# Patient Record
Sex: FEMALE | Race: WHITE | NOT HISPANIC OR LATINO | Employment: UNEMPLOYED | URBAN - METROPOLITAN AREA
[De-identification: names, ages, dates, MRNs, and addresses within clinical notes are randomized per-mention and may not be internally consistent; named-entity substitution may affect disease eponyms.]

---

## 2017-03-22 ENCOUNTER — OFFICE VISIT - RIVER FALLS (OUTPATIENT)
Dept: FAMILY MEDICINE | Facility: CLINIC | Age: 57
End: 2017-03-22

## 2017-03-25 ENCOUNTER — OFFICE VISIT - RIVER FALLS (OUTPATIENT)
Dept: FAMILY MEDICINE | Facility: CLINIC | Age: 57
End: 2017-03-25

## 2017-03-25 ASSESSMENT — MIFFLIN-ST. JEOR: SCORE: 1106.01

## 2017-05-24 ENCOUNTER — OFFICE VISIT - RIVER FALLS (OUTPATIENT)
Dept: FAMILY MEDICINE | Facility: CLINIC | Age: 57
End: 2017-05-24

## 2017-05-24 ENCOUNTER — COMMUNICATION - RIVER FALLS (OUTPATIENT)
Dept: FAMILY MEDICINE | Facility: CLINIC | Age: 57
End: 2017-05-24

## 2017-05-24 ASSESSMENT — MIFFLIN-ST. JEOR: SCORE: 1112.36

## 2017-05-25 ENCOUNTER — OFFICE VISIT - RIVER FALLS (OUTPATIENT)
Dept: FAMILY MEDICINE | Facility: CLINIC | Age: 57
End: 2017-05-25

## 2017-05-25 LAB
CHOLEST SERPL-MCNC: 213 MG/DL (ref 125–200)
CHOLEST/HDLC SERPL: 2.5 {RATIO}
CREAT SERPL-MCNC: 0.72 MG/DL (ref 0.5–1.05)
GLUCOSE BLD-MCNC: 81 MG/DL (ref 65–99)
HDLC SERPL-MCNC: 84 MG/DL
LDLC SERPL CALC-MCNC: 119 MG/DL
NONHDLC SERPL-MCNC: 129 MG/DL
TRIGL SERPL-MCNC: 49 MG/DL

## 2017-06-01 ENCOUNTER — OFFICE VISIT - RIVER FALLS (OUTPATIENT)
Dept: FAMILY MEDICINE | Facility: CLINIC | Age: 57
End: 2017-06-01

## 2017-09-20 ENCOUNTER — OFFICE VISIT - RIVER FALLS (OUTPATIENT)
Dept: FAMILY MEDICINE | Facility: CLINIC | Age: 57
End: 2017-09-20

## 2017-09-20 ASSESSMENT — MIFFLIN-ST. JEOR: SCORE: 1121.43

## 2017-11-08 ENCOUNTER — OFFICE VISIT - RIVER FALLS (OUTPATIENT)
Dept: FAMILY MEDICINE | Facility: CLINIC | Age: 57
End: 2017-11-08

## 2017-11-08 ASSESSMENT — MIFFLIN-ST. JEOR: SCORE: 1124.15

## 2018-01-15 ENCOUNTER — OFFICE VISIT - RIVER FALLS (OUTPATIENT)
Dept: FAMILY MEDICINE | Facility: CLINIC | Age: 58
End: 2018-01-15

## 2018-04-11 ENCOUNTER — OFFICE VISIT - RIVER FALLS (OUTPATIENT)
Dept: FAMILY MEDICINE | Facility: CLINIC | Age: 58
End: 2018-04-11

## 2018-06-29 ENCOUNTER — OFFICE VISIT - RIVER FALLS (OUTPATIENT)
Dept: FAMILY MEDICINE | Facility: CLINIC | Age: 58
End: 2018-06-29

## 2018-06-29 ASSESSMENT — MIFFLIN-ST. JEOR: SCORE: 1129.94

## 2018-10-18 ENCOUNTER — AMBULATORY - RIVER FALLS (OUTPATIENT)
Dept: FAMILY MEDICINE | Facility: CLINIC | Age: 58
End: 2018-10-18

## 2019-03-04 ENCOUNTER — OFFICE VISIT - RIVER FALLS (OUTPATIENT)
Dept: FAMILY MEDICINE | Facility: CLINIC | Age: 59
End: 2019-03-04

## 2019-03-04 ASSESSMENT — MIFFLIN-ST. JEOR: SCORE: 1139.01

## 2019-08-22 ENCOUNTER — OFFICE VISIT - RIVER FALLS (OUTPATIENT)
Dept: FAMILY MEDICINE | Facility: CLINIC | Age: 59
End: 2019-08-22

## 2019-08-22 ASSESSMENT — MIFFLIN-ST. JEOR: SCORE: 1124.72

## 2019-08-23 ENCOUNTER — AMBULATORY - RIVER FALLS (OUTPATIENT)
Dept: FAMILY MEDICINE | Facility: CLINIC | Age: 59
End: 2019-08-23

## 2019-08-24 LAB
BUN SERPL-MCNC: 19 MG/DL (ref 7–25)
BUN/CREAT RATIO - HISTORICAL: NORMAL (ref 6–22)
CALCIUM SERPL-MCNC: 10 MG/DL (ref 8.6–10.4)
CHLORIDE BLD-SCNC: 104 MMOL/L (ref 98–110)
CHOLEST SERPL-MCNC: 206 MG/DL
CHOLEST/HDLC SERPL: 2.5 {RATIO}
CO2 SERPL-SCNC: 29 MMOL/L (ref 20–32)
CREAT SERPL-MCNC: 0.79 MG/DL (ref 0.5–1.05)
EGFRCR SERPLBLD CKD-EPI 2021: 83 ML/MIN/1.73M2
GLUCOSE BLD-MCNC: 89 MG/DL (ref 65–99)
HBA1C MFR BLD: 5.4 %
HDLC SERPL-MCNC: 83 MG/DL
LDLC SERPL CALC-MCNC: 110 MG/DL
NONHDLC SERPL-MCNC: 123 MG/DL
POTASSIUM BLD-SCNC: 4.7 MMOL/L (ref 3.5–5.3)
SODIUM SERPL-SCNC: 140 MMOL/L (ref 135–146)
TRIGL SERPL-MCNC: 50 MG/DL

## 2019-08-27 ENCOUNTER — COMMUNICATION - RIVER FALLS (OUTPATIENT)
Dept: FAMILY MEDICINE | Facility: CLINIC | Age: 59
End: 2019-08-27

## 2019-11-04 ENCOUNTER — AMBULATORY - RIVER FALLS (OUTPATIENT)
Dept: FAMILY MEDICINE | Facility: CLINIC | Age: 59
End: 2019-11-04

## 2020-10-12 ENCOUNTER — OFFICE VISIT - RIVER FALLS (OUTPATIENT)
Dept: FAMILY MEDICINE | Facility: CLINIC | Age: 60
End: 2020-10-12

## 2020-10-12 ASSESSMENT — MIFFLIN-ST. JEOR: SCORE: 1125.4

## 2020-10-13 ENCOUNTER — COMMUNICATION - RIVER FALLS (OUTPATIENT)
Dept: FAMILY MEDICINE | Facility: CLINIC | Age: 60
End: 2020-10-13

## 2020-10-13 LAB
CHOLEST SERPL-MCNC: 196 MG/DL
CHOLEST/HDLC SERPL: 2.3 {RATIO}
GLUCOSE BLD-MCNC: 78 MG/DL (ref 65–99)
HDLC SERPL-MCNC: 84 MG/DL
LDLC SERPL CALC-MCNC: 98 MG/DL
NONHDLC SERPL-MCNC: 112 MG/DL
TRIGL SERPL-MCNC: 46 MG/DL

## 2020-10-20 ENCOUNTER — OFFICE VISIT - RIVER FALLS (OUTPATIENT)
Dept: FAMILY MEDICINE | Facility: CLINIC | Age: 60
End: 2020-10-20

## 2021-01-06 ENCOUNTER — OFFICE VISIT - RIVER FALLS (OUTPATIENT)
Dept: FAMILY MEDICINE | Facility: CLINIC | Age: 61
End: 2021-01-06

## 2021-01-06 ASSESSMENT — MIFFLIN-ST. JEOR: SCORE: 1121.4

## 2021-07-14 ENCOUNTER — OFFICE VISIT - RIVER FALLS (OUTPATIENT)
Dept: FAMILY MEDICINE | Facility: CLINIC | Age: 61
End: 2021-07-14

## 2021-07-15 LAB
ALBUMIN UR-MCNC: NEGATIVE G/DL
APPEARANCE UR: CLEAR
BILIRUB UR QL STRIP: NEGATIVE
COLOR UR AUTO: YELLOW
GLUCOSE UR STRIP-MCNC: NEGATIVE MG/DL
HGB UR QL STRIP: NEGATIVE
KETONES UR STRIP-MCNC: NEGATIVE MG/DL
LEUKOCYTE ESTERASE UR QL STRIP: NEGATIVE
NITRATE UR QL: NEGATIVE
PH UR STRIP: 5.5 [PH]
SP GR UR STRIP: 1.02
UROBILINOGEN UR STRIP-MCNC: NORMAL MG/DL

## 2021-07-16 ENCOUNTER — COMMUNICATION - RIVER FALLS (OUTPATIENT)
Dept: FAMILY MEDICINE | Facility: CLINIC | Age: 61
End: 2021-07-16

## 2021-07-16 LAB — BACTERIA SPEC CULT: NORMAL

## 2021-10-25 ENCOUNTER — AMBULATORY - RIVER FALLS (OUTPATIENT)
Dept: FAMILY MEDICINE | Facility: CLINIC | Age: 61
End: 2021-10-25

## 2021-10-26 ENCOUNTER — COMMUNICATION - RIVER FALLS (OUTPATIENT)
Dept: FAMILY MEDICINE | Facility: CLINIC | Age: 61
End: 2021-10-26

## 2021-10-26 LAB
CHOLEST SERPL-MCNC: 219 MG/DL
CHOLEST/HDLC SERPL: 2.8 {RATIO}
GLUCOSE BLD-MCNC: 85 MG/DL (ref 65–99)
HDLC SERPL-MCNC: 78 MG/DL
LDLC SERPL CALC-MCNC: 126 MG/DL
NONHDLC SERPL-MCNC: 141 MG/DL
TRIGL SERPL-MCNC: 61 MG/DL

## 2021-11-01 ENCOUNTER — OFFICE VISIT - RIVER FALLS (OUTPATIENT)
Dept: FAMILY MEDICINE | Facility: CLINIC | Age: 61
End: 2021-11-01

## 2021-11-01 ASSESSMENT — MIFFLIN-ST. JEOR: SCORE: 1141.28

## 2021-11-04 LAB — HPV HIGH RISK: NOT DETECTED

## 2021-11-05 ENCOUNTER — COMMUNICATION - RIVER FALLS (OUTPATIENT)
Dept: FAMILY MEDICINE | Facility: CLINIC | Age: 61
End: 2021-11-05

## 2022-02-12 VITALS
DIASTOLIC BLOOD PRESSURE: 62 MMHG | WEIGHT: 127 LBS | HEART RATE: 50 BPM | BODY MASS INDEX: 21.63 KG/M2 | SYSTOLIC BLOOD PRESSURE: 102 MMHG

## 2022-02-12 VITALS
TEMPERATURE: 97.6 F | DIASTOLIC BLOOD PRESSURE: 76 MMHG | HEART RATE: 48 BPM | SYSTOLIC BLOOD PRESSURE: 153 MMHG | BODY MASS INDEX: 22.18 KG/M2 | WEIGHT: 130.2 LBS

## 2022-02-12 VITALS
WEIGHT: 127 LBS | BODY MASS INDEX: 21.16 KG/M2 | HEIGHT: 65 IN | TEMPERATURE: 96.5 F | RESPIRATION RATE: 16 BRPM | HEART RATE: 44 BPM | SYSTOLIC BLOOD PRESSURE: 108 MMHG | DIASTOLIC BLOOD PRESSURE: 64 MMHG

## 2022-02-12 VITALS
SYSTOLIC BLOOD PRESSURE: 138 MMHG | DIASTOLIC BLOOD PRESSURE: 72 MMHG | OXYGEN SATURATION: 98 % | HEART RATE: 38 BPM | WEIGHT: 124 LBS | TEMPERATURE: 97.3 F | HEIGHT: 65 IN | BODY MASS INDEX: 20.66 KG/M2

## 2022-02-12 VITALS
DIASTOLIC BLOOD PRESSURE: 68 MMHG | DIASTOLIC BLOOD PRESSURE: 72 MMHG | BODY MASS INDEX: 20.59 KG/M2 | WEIGHT: 120.6 LBS | SYSTOLIC BLOOD PRESSURE: 106 MMHG | TEMPERATURE: 98.7 F | WEIGHT: 121 LBS | BODY MASS INDEX: 20.61 KG/M2 | HEIGHT: 64 IN | OXYGEN SATURATION: 99 % | WEIGHT: 122 LBS | BODY MASS INDEX: 20.83 KG/M2 | SYSTOLIC BLOOD PRESSURE: 118 MMHG | HEART RATE: 51 BPM | TEMPERATURE: 97.4 F | HEART RATE: 58 BPM | HEART RATE: 52 BPM | DIASTOLIC BLOOD PRESSURE: 66 MMHG | SYSTOLIC BLOOD PRESSURE: 118 MMHG | HEIGHT: 64 IN

## 2022-02-12 VITALS
HEIGHT: 64 IN | SYSTOLIC BLOOD PRESSURE: 110 MMHG | HEIGHT: 64 IN | BODY MASS INDEX: 21.27 KG/M2 | DIASTOLIC BLOOD PRESSURE: 60 MMHG | SYSTOLIC BLOOD PRESSURE: 122 MMHG | DIASTOLIC BLOOD PRESSURE: 68 MMHG | TEMPERATURE: 98.3 F | HEART RATE: 48 BPM | HEART RATE: 48 BPM | WEIGHT: 124.6 LBS | BODY MASS INDEX: 21.17 KG/M2 | WEIGHT: 124 LBS

## 2022-02-12 VITALS
DIASTOLIC BLOOD PRESSURE: 66 MMHG | BODY MASS INDEX: 20.83 KG/M2 | TEMPERATURE: 97.3 F | SYSTOLIC BLOOD PRESSURE: 126 MMHG | HEIGHT: 65 IN | WEIGHT: 125 LBS | HEART RATE: 52 BPM

## 2022-02-12 VITALS
SYSTOLIC BLOOD PRESSURE: 124 MMHG | TEMPERATURE: 97.9 F | WEIGHT: 123.12 LBS | DIASTOLIC BLOOD PRESSURE: 72 MMHG | BODY MASS INDEX: 20.51 KG/M2 | HEIGHT: 65 IN | HEART RATE: 44 BPM

## 2022-02-12 VITALS
HEART RATE: 52 BPM | HEIGHT: 64 IN | SYSTOLIC BLOOD PRESSURE: 112 MMHG | WEIGHT: 125.6 LBS | TEMPERATURE: 98.2 F | OXYGEN SATURATION: 98 % | BODY MASS INDEX: 21.44 KG/M2 | DIASTOLIC BLOOD PRESSURE: 66 MMHG

## 2022-02-12 VITALS
TEMPERATURE: 97.2 F | HEART RATE: 39 BPM | BODY MASS INDEX: 21.63 KG/M2 | OXYGEN SATURATION: 99 % | DIASTOLIC BLOOD PRESSURE: 65 MMHG | WEIGHT: 127 LBS | SYSTOLIC BLOOD PRESSURE: 144 MMHG

## 2022-02-12 VITALS
HEIGHT: 65 IN | DIASTOLIC BLOOD PRESSURE: 66 MMHG | HEART RATE: 44 BPM | BODY MASS INDEX: 21.24 KG/M2 | SYSTOLIC BLOOD PRESSURE: 112 MMHG | WEIGHT: 127.5 LBS

## 2022-02-16 NOTE — PROGRESS NOTES
Patient:   TONA LEONE            MRN: [id number]            FIN: [id number]               Age:   57 years     Sex:  Female     :  1960   Associated Diagnoses:   Keratosis, seborrheic   Author:   Aníbal Bauer MD      Chief Complaint   1/15/2018 10:44 AM CST   Skin concern, right face/cheek.        History of Present Illness   chief complaint and symptoms as noted above confirmed with patient   lesion for a few years  a bit bigger      Review of Systems   Integumentary:  Negative except as documented in history of present illness.    Neurologic:  Negative.       Health Status   Allergies:    Allergic Reactions (Selected)  Severity Not Documented  Amoxicillin (Rash)  Penicillin (No reactions were documented)  Sulfa drug (No reactions were documented)   Medications:  (Selected)   Documented Medications  Documented  Daily Multiple Vitamins: 1 tab(s), po, daily, 0 Refill(s), Type: Maintenance  Fish Oil: po, daily, 0 Refill(s), Type: Maintenance  Iron Chews: ( 15 mg ), po, daily, 0 Refill(s), Type: Maintenance  Vitamin D3: po, daily, 0 Refill(s), Type: Maintenance   Problem list:    All Problems (Selected)  BRCA1 positive / SNOMED CT 7687077382 / Confirmed  History of breast cancer / SNOMED CT 8724684272 / Confirmed  Seasonal allergies / SNOMED CT 462779184 / Confirmed      Histories   Past Medical History:    Active  History of breast cancer (1083251635): Onset on 2013 at 53 years.  Comments:  2013 CDT 3:27 PM CDT - aCmden NP-C, Olivia  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  BRCA1 positive (6600575188)  Seasonal allergies (704019488)  Resolved  Atypical squamous cells of undetermined significance (ASCUS) on Papanicolaou smear of cervix (7501088335): Onset in the month of 2000 at 40 years  Resolved on 5/3/2010 at 50 years.  Cyst, ovary, dermoid (7252403177):  Resolved on 5/3/2010 at 50 years.  Comments:  5/3/2010 CDT 9:14 AM CDT - Nicci Anderson  left oophorectomy  Pregnancy  (331150442):  Resolved in  at 24 years.  Pregnancy (809982246):  Resolved in  at 26 years.  Pregnancy (798870677):  Resolved in  at 29 years.  History of chicken pox (757916433):  Resolved.   Family History:    CA - Breast cancer  Grandmother (M) ()  Comments:  3/6/2013 2:43 PM - Adelaida Antonio  post menopausal  Diabetes mellitus  Brother  Breast cancer  Aunt (M)  Comments:  2013 4:09 PM - Cassandra-Woll NP-C, Olivia  Post-menopausal.  Aunt (P)  Comments:  2013 4:09 PM - Cassandra-Woll NP-C, Olivia  Post-menopausal.  Prediabetes  Mother ()  Pancreatic cancer  Mother ()  Diabetes mellitus type 2  Father  Grandmother (M) ()  High blood pressure  Brother  Brother  Myocardial infarction  Father  Stroke  Grandmother (P) ()  Cancer  Grandfather (P) ()  CABG - Coronary artery bypass graft  Father: onset at 75 .  Comments:  3/6/2013 2:46 PM - Adelaida Antonio  stents also  MI - Myocardial infarction  Mother (): onset at 78 .  Comments:  3/6/2013 2:47 PM - Adelaida Antonio  mild  Coronary artery disease  Mother ()  CAD - Coronary artery disease  Father     Procedure history:    Right salpingo-oophorectomy (SNOMED CT 158572201) performed by Gus Snell MD on 2015 at 54 Years.  Breast reconstruction (SNOMED CT 90569719) in the month of 2014 at 54 Years.  Simple mastectomy of right breast (SNOMED CT 5651076057) performed by Gus Andrade on 2013 at 53 Years.  Insertion of tunneled centrally inserted central venous access device, with subcutaneous port; age 5 years or older (CPT-4 21223) performed by Gus Andrade on 2013 at 53 Years.  Lumpectomy of breast (SNOMED CT 1638206339) on 2013 at 53 Years.  Biopsy of breast (SNOMED CT 660849933) on 2013 at 53 Years.  Comments:  2013 3:24 PM - Cassandra-Woll NP-C, Olivia  Positive.  Colonoscopy (SNOMED CT 479244267) performed by Lalito Pineda MD on 2012 at 52  Years.  Comments:  7/12/2012 10:22 AM - Cassius MCKENZIE Maye  Normal repeat in 10 years  DEXA - Dual energy X-ray photon absorptiometry (SNOMED CT 941466712) on 4/23/2012 at 52 Years.  Comments:  4/26/2012 9:03 AM - Olivia Cruz. Just starting perimenopause.  HPV - Human papillomavirus test negative (SNOMED CT 7900286880) on 3/1/2009 at 49 Years.  Comments:  3/29/2011 3:21 PM - Olivia Cruz  Low risk for cervical cancer. OK to have pap q 3 yrs.  Laparotomy (SNOMED CT 757534568) on 5/17/1999 at 39 Years.  Left oophorectomy (SNOMED CT 958473115) on 5/17/1999 at 39 Years.  Comments:  3/29/2011 3:24 PM - Olivia Cruz  Dermoid cyst.  Colposcopy (SNOMED CT 8453317396).   Social History:        Alcohol Assessment            Current, Daily, 1 drinks/episode average.  2 drinks/episode maximum.      Tobacco Assessment            Never smoker      Substance Abuse Assessment            Never      Employment and Education Assessment            Employed, Work/School description: .      Home and Environment Assessment            Marital status: .  Spouse/Partner name: Dilshad.  Risks in environment: owns secured gun.      Nutrition and Health Assessment            Type of diet: Regular.      Exercise and Physical Activity Assessment            Exercise frequency: 6 times/week.  Exercise type: Running, Weight lifting.      Sexual Assessment            Sexually active: Yes.  Sexual orientation: Heterosexual.  Contraceptive Use Details:  had vasectomy.        Physical Examination   Vital Signs   1/15/2018 10:44 AM CST Temperature Tympanic 97.6 DegF  LOW    Peripheral Pulse Rate 48 bpm  LOW    HR Method Electronic    Systolic Blood Pressure 153 mmHg  HI    Diastolic Blood Pressure 76 mmHg    Mean Arterial Pressure 102 mmHg    BP Method Electronic      Measurements from flowsheet : Measurements   1/15/2018 10:44 AM CST   Weight Measured - Standard                130.2 lb      General:  Alert and oriented, No acute distress.    Integumentary:  1cm tan waxy elevated left lateral face lesion    frozen x3 with liquid nitrogen.    Neurologic:  Alert, Oriented.       Impression and Plan   Diagnosis     Keratosis, seborrheic (JGI26-MG L82.1).     Course:  Progressing as expected.    Plan:  Wound therapy, rech in 2-3 weeks.    Patient Instructions:       Counseled: Patient, Regarding diagnosis, Activity.

## 2022-02-16 NOTE — NURSING NOTE
Depression Screening Entered On:  11/1/2021 12:03 PM CDT    Performed On:  11/1/2021 12:02 PM CDT by Matthew Jones LPN               Depression Screening   Little Interest - Pleasure in Activities :   Not at all   Feeling Down, Depressed, Hopeless :   Not at all   Initial Depression Screen Score :   0 Score   Poor Appetite or Overeating :   Not at all   Trouble Falling or Staying Asleep :   Not at all   Feeling Tired or Little Energy :   Not at all   Feeling Bad About Yourself :   Not at all   Trouble Concentrating :   Not at all   Moving or Speaking Slowly :   Not at all   Thoughts Better Off Dead or Hurting Self :   Not at all   Detailed Depression Screen Score :   0    Total Depression Screen Score :   0    Matthew Jones LPN - 11/1/2021 12:02 PM CDT

## 2022-02-16 NOTE — PROGRESS NOTES
Patient:   TONA LEONE            MRN: [id number]            FIN: [id number]               Age:   57 years     Sex:  Female     :  1960   Associated Diagnoses:   Breast Cancer; FH: heart disease; FH: pancreatic cancer; Well woman exam   Author:   Jennifer Weston      Visit Information      Date of Service: 2017 07:53 am  Performing Location: [facility]  Encounter#: [id number]      Primary Care Provider (PCP):  Aníbal Bauer MD# [id number]      Chief Complaint   2017 7:53 AM CDT    Annual exam.      Well Adult History   PPC for her annual wellness exam  Pap due this yr (NILM )  right salpingo-oophrectomy; left oophrectomy  hx of breast cancer with simple mastectomy: bilateral reconstruction  colonscopy due   dexa done prior to chemo so this should be repeataed  see dentist at least annually and see eye doctor annually  , supportive relationship, no concerns with libido  three adult sons  PHQ9=0         Review of Systems   Constitutional:  Negative.    Eye:  Negative.    Ear/Nose/Mouth/Throat:  Negative.    Respiratory:  Negative.    Cardiovascular:  Negative.    Breast:       Nipple discharge: bilateral mastectomy with reconstruction: did have chemo.    Gastrointestinal:  Negative.    Genitourinary:  Negative.    Gynecologic:  Negative, Negative except as documented in history of present illness.    Hematology/Lymphatics:  Negative.    Endocrine:  Negative.    Immunologic:  Negative.    Musculoskeletal:  Negative.    Integumentary:  Negative.    Neurologic:  Negative.    Psychiatric:  Negative.             Health Status   Allergies:    Allergic Reactions (Selected)  Severity Not Documented  Amoxicillin (Rash)  Penicillin (No reactions were documented)  Sulfa drug (No reactions were documented)   Medications:  (Selected)   Documented Medications  Documented  Daily Multiple Vitamins: 1 tab(s), po, daily, 0 Refill(s), Type: Maintenance  Fish Oil: po,  daily, 0 Refill(s), Type: Maintenance  Vitamin D3: po, daily, 0 Refill(s), Type: Maintenance   Problem list:    All Problems (Selected)  BRCA1 Positive / ICD-9-CM V84.01 / Confirmed  Breast Cancer / SNOMED CT 5114490602 / Confirmed      Histories   Family History:    CA - Breast cancer  Grandmother (M) ()  Comments:  3/6/2013 2:43 PM - Adelaida Antonio  post menopausal  Diabetes mellitus  Brother  Breast cancer  Aunt (M)  Comments:  2013 4:09 PM - Camden NP-C, Olivia  Post-menopausal.  Aunt (P)  Comments:  2013 4:09 PM - Camden NP-C, Olivia  Post-menopausal.  Prediabetes  Mother ()  Pancreatic cancer  Mother ()  Diabetes mellitus type 2  Father  Grandmother (M) ()  High blood pressure  Brother  Brother  Myocardial infarction  Father  Stroke  Grandmother (P) ()  Cancer  Grandfather (P) ()  CABG - Coronary artery bypass graft  Father: onset at 75 .  Comments:  3/6/2013 2:46 PM - Adelaida Antonio  stents also  MI - Myocardial infarction  Mother (): onset at 78 .  Comments:  3/6/2013 2:47 PM - Adelaida Antonio  mild  Coronary artery disease  Mother ()  CAD - Coronary artery disease  Father     Procedure history:    Right salpingo-oophorectomy (343843725) on 2015 at 54 Years.  Breast reconstruction (04113612) in the month of 2014 at 54 Years.  Simple mastectomy of right breast (8930346494) on 2013 at 53 Years.  Insertion of tunneled centrally inserted central venous access device, with subcutaneous port; age 5 years or older (37609) on 2013 at 53 Years.  Lumpectomy of breast (1679428221) on 2013 at 53 Years.  Biopsy of breast (293008635) on 2013 at 53 Years.  Comments:  2013 3:24 PM - Camden NP-C, Olivia  Positive.  Colonoscopy (991717566) on 2012 at 52 Years.  Comments:  2012 10:22 AM - Maye Hammonds MA  Normal repeat in 10 years  DEXA - Dual energy X-ray photon absorptiometry (000148252) on 2012 at  52 Years.  Comments:  4/26/2012 9:03 AM - Olivia Cruz  Normal. Just starting perimenopause.  HPV - Human papillomavirus test negative (5261493722) on 3/1/2009 at 49 Years.  Comments:  3/29/2011 3:21 PM - Olivia Cruz  Low risk for cervical cancer. OK to have pap q 3 yrs.  Laparotomy (852276092) on 5/17/1999 at 39 Years.  Left oophorectomy (414473546) on 5/17/1999 at 39 Years.  Comments:  3/29/2011 3:24 PM - Olivia Cruz  Dermoid cyst.  Colposcopy (2879511126).   Social History:        Alcohol Assessment: Current            3-5 times per week                     Comments:                      06/02/2016 - Adelaida Antonio                     1-2 per time.      Tobacco Assessment: Denies Tobacco Use      Substance Abuse Assessment: Denies Substance Abuse      Employment and Education Assessment            Employed, Work/School description: .      Exercise and Physical Activity Assessment            Exercise frequency: 6 times/week.  Exercise type: Running, Weight lifting.      Sexual Assessment            Sexually active: Yes.  Number of current partners 1.  Sexual orientation: Heterosexual..  Other contraceptive               use:  vasectomy..                     Comments:                      03/29/2011 - Olivia Cruz                     .        Physical Examination   Vital Signs   5/24/2017 7:53 AM CDT Temperature Tympanic 97.4 DegF  LOW    Peripheral Pulse Rate 52 bpm  LOW    Pulse Site Radial artery    HR Method Manual    Systolic Blood Pressure 118 mmHg    Diastolic Blood Pressure 68 mmHg    Mean Arterial Pressure 85 mmHg    BP Site Right arm    BP Method Manual      Measurements from flowsheet : Measurements   5/24/2017 7:53 AM CDT Height Measured - Standard 64.25 in    Weight Measured - Standard 122 lb    BSA 1.58 m2    Body Mass Index 20.78 kg/m2      General:  Alert and oriented, No acute distress.    Eye:  Pupils are equal, round and  reactive to light, Intact accommodation, Normal conjunctiva, Vision unchanged.         Periorbital area: Within normal limits.    HENT:  Normocephalic, Tympanic membranes are clear, Normal hearing, Oral mucosa is moist, No pharyngeal erythema, No sinus tenderness.    Neck:  Supple, Non-tender, No lymphadenopathy, No thyromegaly.    Respiratory:  Lungs are clear to auscultation, Respirations are non-labored, No chest wall tenderness.    Cardiovascular:  Normal rate, Regular rhythm, No murmur, No edema.    Breast:  No mass, No tenderness, bilateral mastectomy with reconstruction  left breast inner aspect scar line red which has been a chronic issue and she has seen Dr Ibanez for this.  It is felt this is rub traum from running, has tried mole skin to cover site but is not consistent with this.    Gastrointestinal:  Soft, Non-tender, Non-distended, Normal bowel sounds, No organomegaly.    Genitourinary:  No costovertebral angle tenderness, Adnexa absent.         Labia: Bilateral, Within normal limits.         Mons pubis: WNL.         Perineum: Within normal limits.         Vulva: Within normal limits.         Urethra: Within normal limits.         Cervix: Within normal limits.         Uterus: Within normal limits.    Lymphatics:  No lymphadenopathy neck, axilla, groin.    Musculoskeletal:  Normal range of motion, Normal strength, No swelling.    Integumentary:  Warm, Dry, Pink.    Neurologic:  Alert, Oriented.    Psychiatric:  Cooperative, Appropriate mood & affect.       Review / Management   Results review:  CMP, Hgb today and lipids.       Impression and Plan   Diagnosis     Breast Cancer (KHG97-DG C50.911).     FH: heart disease (GZT68-QH Z82.49).     FH: pancreatic cancer (PLH01-HN Z80.0).     Well woman exam (XRK91-NW Z01.419).     Patient Instructions:       Counseled: Patient, Verbalized understanding.    Summary:  such a healthy woman with a long fy hx of cancers  screening labs, no great pancreatic cancer  screening   dexa will be ordered although last one was normal only because this was done pre-chemo  pap collected  colonoscopy 2022  tegaderm dressings given to try for left breast irritation.

## 2022-02-16 NOTE — PROGRESS NOTES
Patient:   TONA LEONE            MRN: [id number]            FIN: [id number]               Age:   61 years     Sex:  Female     :  1960   Associated Diagnoses:   Screening for cervical cancer; Well adult exam   Author:   Nas Gonzalez MD      Visit Information   Visit type:  Annual exam.    Source of history:  Self.    History limitation:  None.       Chief Complaint   2021 10:19 AM CDT   Annual Physical - No other concerns      Well Adult History   Well Adult History             The patient presents for well adult exam.  The patient's general health status is described as good.  The patient's diet is described as balanced.  Exercise: routine.  Associated symptoms consist of none.  Last menstrual period: postmenopausal.  Medical encounters: none.  Additional pertinent history: notes family history of skin cancer, no particular concerns but would like skin exam.     Due for pap smear today. Colon cancer screening up to date until . Had bilateral mastectomy - mammogram not needed      Review of Systems   ROS reviewed as documented in chart      Health Status   Allergies:    Allergic Reactions (Selected)  Severity Not Documented  Amoxicillin (Rash)  Penicillin (No reactions were documented)  Sulfa drug (No reactions were documented)   Medications:  (Selected)   Documented Medications  Documented  Daily Multiple Vitamins: 1 tab(s), po, daily, 0 Refill(s), Type: Maintenance  Fish Oil: po, daily, 0 Refill(s), Type: Maintenance  Iron Chews: ( 15 mg ), po, daily, 0 Refill(s), Type: Maintenance  Vitamin D3: po, daily, 0 Refill(s), Type: Maintenance,    Medications          *denotes recorded medication          *Iron Chews: 15 mg, po, daily, 0 Refill(s).          *Vitamin D3: po, daily, 0 Refill(s).          *Daily Multiple Vitamins: 1 tab(s), po, daily, 0 Refill(s).          *Fish Oil: po, daily, 0 Refill(s).       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  Seasonal allergies / SNOMED CT 826059225 / Confirmed  Viral upper respiratory tract infection / SNOMED CT 707158837 / Confirmed  Inactive: Axillary lymphadenopathy / SNOMED CT 330648  benign right  Resolved: Atypical squamous cells of undetermined significance (ASCUS) on Papanicolaou smear of cervix / SNOMED CT 0275756585  Resolved: Cyst, ovary, dermoid / SNOMED CT 3633550195  left oophorectomy  Resolved: History of chicken pox / SNOMED CT 535978190  Resolved: Pregnancy / SNOMED CT 848709978  Resolved: Pregnancy / SNOMED CT 200508275  Resolved: Pregnancy / SNOMED CT 685011900      Histories   Past Medical History:    Active  History of breast cancer (SNOMED CT 5055772772): Onset on 2013 at 53 years.  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  BRCA1 positive (SNOMED CT 5827560992)  Seasonal allergies (SNOMED CT 823057570)  Resolved  Atypical squamous cells of undetermined significance (ASCUS) on Papanicolaou smear of cervix (SNOMED CT 8324802986): Onset in the month of 2000 at 40 years  Resolved on 5/3/2010 at 50 years.  Cyst, ovary, dermoid (SNOMED CT 5085187313):  Resolved on 5/3/2010 at 50 years.  Comments:  5/3/2010 CDT 9:14 AM CDT - Nicci Anderson  left oophorectomy  Pregnancy (SNOMED CT 822441844):  Resolved on 1985 at 25 years.  Pregnancy (SNOMED CT 553600279):  Resolved on 5/3/1987 at 27 years.  Pregnancy (SNOMED CT 254990182):  Resolved on 1990 at 30 years.  History of chicken pox (SNOMED CT 455440411):  Resolved.   Family History:    CA - Breast cancer  Grandmother (M) ()  Comments:  3/6/2013 2:43 PM CST - Adelaida Antonio  post menopausal  Diabetes mellitus  Brother  Breast cancer  Aunt (M)  Comments:  2013 4:09 PM CDT - Cam[place] DAVIS, Olivia  Post-menopausal.  Aunt (P)  Comments:  2013 4:09 PM CDT -  Olivia Cruz  Post-menopausal.  Prediabetes  Mother ()  Pancreatic cancer  Mother ()  Diabetes mellitus type 2  Father  Grandmother (M) ()  High blood pressure  Brother  Brother  Father  Arthritis  Mother ()  Father  Myocardial infarction  Father  Stroke  Grandmother (P) ()  CABG - Coronary artery bypass graft  Father: onset at 75 .  Comments:  3/6/2013 2:46 PM Adelaida Bernal  stents also  MI - Myocardial infarction  Mother (): onset at 78 .  Comments:  3/6/2013 2:47 PM Adelaida Bernal  mild  Coronary artery disease  Mother ()  CAD - Coronary artery disease  Father  Cancer in situ of prostate  Grandfather (P) ()  High cholesterol  Father     Procedure history:    Extracapsular cataract extraction and insertion of intraocular lens (305707533) on 2021 at 60 Years.  Comments:  2021 9:30 AM Herlinda Ayoub  Left.  Extracapsular cataract extraction and insertion of intraocular lens (533601908) on 2021 at 60 Years.  Comments:  2021 10:55 AM Herlinda Ayoub  Right.  Laser eye surgery for retinal tear right eye (1933624067) in the month of 3/2020 at 60 Years.  Date of last PAP test (9148638927) on 5/15/2017 at 57 Years.  Right salpingo-oophorectomy (389537183) on 2015 at 54 Years.  Breast reconstruction (71889184) in the month of 2014 at 54 Years.  Bilateral mastectomy (63855901) in the month of 10/2013 at 53 Years.  Insertion of tunneled centrally inserted central venous access device, with subcutaneous port; age 5 years or older (32936) on 2013 at 53 Years.  Lumpectomy of breast (7365908209) on 2013 at 53 Years.  Biopsy of breast (556737620) on 2013 at 53 Years.  Comments:  2013 3:24 PM DARNELLT - Olivia Cruz  Positive.  Colonoscopy (915758296) on 2012 at 52 Years.  Comments:  2012 10:22 AM CDT - Maye Hammonds MA  Normal repeat in 10 years  DEXA - Dual energy X-ray  photon absorptiometry (952926647) on 4/23/2012 at 52 Years.  Comments:  4/26/2012 9:03 AM LLUVIA - Olivia Cruz  Normal. Just starting perimenopause.  HPV - Human papillomavirus test negative (2768433143) on 3/1/2009 at 49 Years.  Comments:  3/29/2011 3:21 PM Olivia Kemp  Low risk for cervical cancer. OK to have pap q 3 yrs.  Laparotomy (350452487) on 5/17/1999 at 39 Years.  Left oophorectomy (107572922) on 5/17/1999 at 39 Years.  Comments:  3/29/2011 3:24 PM Olivia Kemp  Dermoid cyst.  Colposcopy (6458828753).   Social History:        Electronic Cigarette/Vaping Assessment            Electronic Cigarette Use: Never.      Alcohol Assessment            Current, Beer (12 oz), Wine (5 oz), 3-5 times per week, 1 drinks/episode average.  2 drinks/episode maximum.               Ready to change: No.      Tobacco Assessment            Never (less than 100 in lifetime)      Substance Abuse Assessment            Never      Employment and Education Assessment            Part time, Work/School description: office work.  Highest education level: University degree(s).      Home and Environment Assessment            Marital status: .  Spouse/Partner name: Dilshad.  Mariella children.  Living situation: Home/Independent.               Injuries/Abuse/Neglect in household: No.  Feels unsafe at home: No.  Family/Friends available for support:               Yes.  Risks in environment: Stairs, owns secured gun.      Nutrition and Health Assessment            Type of diet: Regular.  Wants to lose weight: No.  Sleeping concerns: No.  Feels highly stressed: No.      Exercise and Physical Activity Assessment            Exercise frequency: 5-6 times/week.  Exercise type: Running, Weight lifting.      Sexual Assessment            Sexually active: Yes.  Identifies as female, Sexual orientation: Straight or heterosexual.  History of STD:               No.  History of sexual abuse: No.        Physical  Examination   Vital Signs   11/1/2021 10:19 AM CDT Peripheral Pulse Rate 44 bpm  LOW    Pulse Site Radial artery    HR Method Manual    Systolic Blood Pressure 112 mmHg    Diastolic Blood Pressure 66 mmHg    Mean Arterial Pressure 81 mmHg    BP Site Right arm    BP Method Manual      Measurements from flowsheet : Measurements   11/1/2021 10:19 AM CDT Height Measured - Standard 64.5 in    Weight Measured - Standard 127.5 lb    BSA 1.62 m2    Body Mass Index 21.54 kg/m2      General:  Alert and oriented, No acute distress.    Eye:  Pupils are equal, round and reactive to light, Extraocular movements are intact, Normal conjunctiva.    HENT:  Normocephalic, Tympanic membranes are clear, Oral mucosa is moist, No pharyngeal erythema.    Neck:  Supple, Non-tender, No lymphadenopathy, No thyromegaly.    Respiratory:  Lungs are clear to auscultation.    Cardiovascular:  Regular rhythm, bradycardic.    Breast:  bilateral mastectomy, no axillary lymphadenopathy.    Gastrointestinal:  Soft, Non-tender, Non-distended, No organomegaly.    Genitourinary:  Normal genitalia for age and sex, No urethral discharge, No lesions.         Perineum: Within normal limits.         Vagina: Within normal limits.         Uterus: Within normal limits.         Ovaries: Within normal limits.         Adnexa: Within normal limits.    Musculoskeletal:  Normal range of motion, Normal strength.    Integumentary:  Warm, Dry, Pink, No rash, no concerning lesions, seborrheic keratoses noted on back.    Neurologic:  Alert, Oriented, Normal sensory.    Psychiatric:  Cooperative, Appropriate mood & affect.       Review / Management   Results review:  Lab results   10/25/2021 8:07 AM CDT Glucose Level 85 mg/dL    Cholesterol 219 mg/dL  HI    Non-  HI    HDL 78 mg/dL    Chol/HDL Ratio 2.8      HI    Triglyceride 61 mg/dL   .       Impression and Plan   Diagnosis     Screening for cervical cancer (OBW95-FM Z12.4).     Well adult exam (ZXG31-AC  Z00.00).     Course:  Progressing as expected.    Patient Instructions:       Counseled: Patient, BMI, diet, and exercise.    Orders     Orders (Selected)   Outpatient Orders  Ordered (In Transit)  ThinPrep Pap and HPV mRNA E6/E7* ([facility]): Specimen Type: Pap, Collection Date: 11/01/21 11:15:00 CDT.

## 2022-02-16 NOTE — LETTER
(Inserted Image. Unable to display)   [address] [place] [address]  October 21, 2020    TONA LEONE   [address] [place], [place] [id number]      Dear TONA,      Thank you for selecting [facility] for your [facility] needs.      Your bone density results indicate:  X   Normal bone density  _   Osteopenia in hip/spine (mild bone thinning   not osteoporosis)  _   Osteoporosis in hip/spine  Our recommendation is:  _   Schedule an appointment with your health care provider to discuss your results.     X   Continue current regimen    _   Calcium    Recommended daily amounts for men and women are:  o Men age 50 - 69  1000 mg  o Men age 70+  1200 mg  o Women age 50+  1200 mg  Vitamin D    800 - 1000 IU daily  Weight bearing Exercise    30 minutes or more several times a week  Avoid excess alcohol and smoking  Prevent falling    Avoid excessive sedation or hypotension (low blood pressure)    Improve strength    Decrease or remove environmental hazards    X   Repeat bone density in 5-7 year(s)            Please contact me or my assistant at [phone] if you have any questions or concerns.     Sincerely,        Nas Gonzalez MD

## 2022-02-16 NOTE — PROGRESS NOTES
Chief Complaint    Physical  History of Present Illness      Pt here today for annual exam      hx of breast cancer, mom had pancreatic cancer, she is brca 1 gene mutation positive, s/p mastectomy and oophorectomy, reviewed increased risk of pancreatic cancer with both BRCA 1 and Brca 2, suggested she talk with GI about pancreatic cancer screening in a high risk patient.      Review of systems is negative except as per HPI including no fevers, chills, sore throat, runny nose, nausea, vomiting, constipation, diarrhea, rash or new skin lesions, chest pain, palpitations, slurred speech, new paresthesia, shortness of breath or wheeze.             Exam:      see vitals listed below      General: alert and oriented ×3 no acute distress.      HEENT: Normocephalic and atraumatic.       Eyes pupils are equal round and reactive to light extraocular motion is intact. normal conjunctiva      Hearing is grossly normal and there is no otorrhea. Tympanic membranes are pearly grey with a normal light reflex.      Nares are patent there is no rhinorrhea.       Mucous membranes are moist and pink.      Chest: has bilateral rise with no increased work of breathing. clear to auscultation without wheezes, rhonchi, or rales.      Cardiovascular: normal perfusion and brisk capillary refill. S1S2 with regular rate and rhythm and no murmurs, gallops or rubs.      Musculoskeletal: no gross focal abnormalities and normal gait.      Neuro: no gross focal abnormalities and memory seems intact.  CN 2-12 are grossly intact.      Psychiatric: speech is clear and coherent and fluent. Patient dressed appropriately for the weather. Mood is appropriate and affect is full.      Abd: no rebound or guarding, normal BS      Skin: no rash or lesion identified      Breast:  Pt declined, s/p mastectomy             : declined             Discussed:      using sunscreen, protecting from sunburn, regular self skin checks      refer to usdachoosemyplate.gov,  AHA and ADA for diet and exercise recommendations      consume [id number] mg calcium daily      do weight bearing exercises      can get mammo at hospital by calling up and scheduling, do not need an order from me      get 120min /week of aerobic exercise      candidate for shingrix      up to date on colon cancer screening      may use vegetable oil for vaginal lubrication if intercourse is painful or may consider R/B of HRT systemic or local  Physical Exam   Vitals & Measurements    T: 98.2   F (Tympanic)  HR: 52(Peripheral)  BP: 112/66  SpO2: 98%     HT: 64 in  WT: 125.6 lb  BMI: 21.56   Assessment/Plan       Anemia (D64.9)                BRCA1 positive (Z15.01)         Ordered:          Referral (Request), 08/22/19 15:42:00 CDT, Referred to: Gastroenterology, Referred to: [facility], BRCA1 positive  Family history of pancreatic cancer                Family history of pancreatic cancer (Z80.0)         Ordered:          Referral (Request), 08/22/19 15:42:00 CDT, Referred to: Gastroenterology, Referred to: [facility], BRCA1 positive  Family history of pancreatic cancer                Immunization due (Z23)         Ordered:          zoster vaccine, inactivated, 0.5 mL, IM, once, (Completed)          24144 imadm prq id subq/im njxs 1 vaccine (Charge), Quantity: 1, Immunization due          46470 hzv zoster vacc recombinant adjuvanted im use (Charge), Quantity: 1, Immunization due                Well adult exam (Z00.00)         Ordered:          93576 periodic preventive med est patient 40-64yrs (Charge), Quantity: 1, Well adult exam                Orders:         Basic Metabolic Panel* ([facility]), Specimen Type: Serum, Collection Date: 08/23/19 7:00:00 CDT         CBC (h/h, RBC, indices, WBC, Plt)* ([facility]), Specimen Type: Blood, Collection Date: 08/22/19 15:46:00 CDT         Hemoglobin A1c* ([facility]), Specimen Type: Blood, Collection Date: 08/23/19 7:00:00 CDT         Lipid panel with reflex to  direct ldl* ([facility]), Specimen Type: Serum, Collection Date: 08/23/19 7:00:00 CDT         Return to Clinic (Request), RFV: lab only for Hgba1c and FLP for screening, BMP  Patient Information     Name:TONA LEONE      Address:      [address] [address]     Sex:Female     YOB: 1960     [phone]     Emergency Contact:ILIANA LEONE     MRN:[id number]     FIN:[id number]     Location:[facility]     Date of Service:08/22/2019      Primary Care Physician:       Aníbal Bauer MD, [phone]      Attending Physician:       Misbah DAWKINS, Radha, [phone]  Problem List/Past Medical History    Ongoing     Axillary lymphadenopathy       Comments: benign right     BRCA1 positive     History of breast cancer       Comments: 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     Seasonal allergies    Historical     Atypical squamous cells of undetermined significance (ASCUS) on Papanicolaou smear of cervix     Cyst, ovary, dermoid       Comments: left oophorectomy     History of chicken pox     Pregnancy     Pregnancy     Pregnancy  Procedure/Surgical History     Right salpingo-oophorectomy (01/02/2015)           Breast reconstruction (04.2014)           Simple mastectomy of right breast (11/04/2013)           Insertion of tunneled centrally inserted central venous access device, with subcutaneous port; age 5 years or older (06/13/2013)           Lumpectomy of breast (05/24/2013)           Biopsy of breast (05/02/2013)            Comments: Positive..     Colonoscopy (06/26/2012)            Comments: Normal repeat in 10 years.     DEXA - Dual energy X-ray photon absorptiometry (04/23/2012)            Comments: Normal. Just starting perimenopause..     HPV - Human papillomavirus test negative (03/01/2009)            Comments: Low risk for cervical cancer. OK to have pap q 3 yrs..     Laparotomy (05/17/1999)           Left oophorectomy (05/17/1999)             Comments: Dermoid cyst..     Colposcopy        Medications    Daily Multiple Vitamins, 1 tab(s), Oral, daily    Fish Oil, Oral, daily    Iron Chews, 15 mg, Oral, daily    Vitamin D3, Oral, daily  Allergies    amoxicillin (Rash)    penicillin    sulfa drug  Social History    Smoking Status - 08/22/2019     Never smoker     Alcohol      Current, Daily, 1 drinks/episode average. 2 drinks/episode maximum., 05/24/2017     Employment/School      Employed, Work/School description: ., 06/02/2016     Exercise      Exercise frequency: 6 times/week. Exercise type: Running, Weight lifting., 03/29/2011     Home/Environment      Marital status: . Spouse/Partner name: Dilshad. Risks in environment: Stairs, owns secured gun., 07/02/2018     Nutrition/Health      Type of diet: Regular., 05/24/2017     Sexual      Sexually active: Yes. Identifies as female, Sexual orientation: Straight or heterosexual., 07/02/2018     Substance Abuse      Never, 05/24/2017     Tobacco      Never smoker, 05/24/2017  Family History    Breast cancer: Aunt (M) and Aunt (P).    CA - Breast cancer: Grandmother (M).    CABG - Coronary artery bypass graft: Father (Dx at 75).    CAD - Coronary artery disease: Father.    Cancer: Grandfather (P).    Coronary artery disease: Mother.    Diabetes mellitus: Brother.    Diabetes mellitus type 2: Father and Grandmother (M).    High blood pressure: Brother and Brother.    MI - Myocardial infarction: Mother (Dx at 78).    Myocardial infarction: Father.    Pancreatic cancer: Mother.    Prediabetes: Mother.    Stroke: Grandmother (P).  Immunizations      Vaccine Date Status      zoster vaccine, inactivated 08/22/2019 Given      influenza virus vaccine, inactivated 10/18/2018 Given      influenza virus vaccine, inactivated 09/20/2017 Given      tetanus/diphth/pertuss (Tdap) adult/adol 04/23/2012 Given      Hep B 05/27/2003 Recorded      Hep B 01/28/2003 Recorded      Hep B 11/28/2002 Recorded      Lyme  disease 06/08/1999 Recorded      Lyme disease 05/06/1999 Recorded      Td 11/01/1997 Recorded      Td 01/01/1987 Recorded

## 2022-02-16 NOTE — LETTER
(Inserted Image. Unable to display)     July 03, 2019      TONA LEONE   [address] [place], [place] [id number]          Dear TONA,      Thank you for selecting [facility] ([facility] for your healthcare needs.      Our records indicate you are due for the following services:     Annual Physical and Gynecologic Exam ~ Yearly wellness exams are important for your ongoing health and wellness.  This exam gives you the opportunity to meet with your health care provider to review your health, update immunizations and to recommend preventive screenings that you may be due for.  At your wellness visit, your Healthcare Provider will determine the need for a gynecologic exam and/or Pap smear.      To schedule an appointment or if you have further questions, please contact your primary clinic:   [facility]       [phone]   [facility]       [phone]              [facility]     [phone]      Powered by [employer]    Sincerely,    Healthcare Providers at [facility]

## 2022-02-16 NOTE — PROGRESS NOTES
Patient:   TONA LEONE            MRN: [id number]            FIN: [id number]               Age:   59 years     Sex:  Female     :  1960   Associated Diagnoses:   Right foot pain; Corn of foot   Author:   Mc Melendez PA-C      Chief Complaint   3/4/2019 10:53 AM CST    Pt here for Right foot pain and edema on and off x 2 months.      History of Present Illness   Chief complaint and symptoms noted above and confirmed with patient   2  month hx of right foot pain at base of 3rd toe, no acute injury  she is a runner, sometimes hurts when running, some swelling  has not run for 5 days  has been going to PT for the past 2 weeks         Health Status   Allergies:    Allergic Reactions (Selected)  Severity Not Documented  Amoxicillin (Rash)  Penicillin (No reactions were documented)  Sulfa drug (No reactions were documented)   Medications:  (Selected)   Documented Medications  Documented  Daily Multiple Vitamins: 1 tab(s), po, daily, 0 Refill(s), Type: Maintenance  Fish Oil: po, daily, 0 Refill(s), Type: Maintenance  Iron Chews: ( 15 mg ), po, daily, 0 Refill(s), Type: Maintenance  Vitamin D3: po, daily, 0 Refill(s), Type: Maintenance   Problem list:    All Problems (Selected)  BRCA1 positive / SNOMED CT 4055986453 / Confirmed  History of breast cancer / SNOMED CT 9737119894 / Confirmed  Seasonal allergies / SNOMED CT 725645412 / Confirmed      Histories   Past Medical History:    Active  History of breast cancer (3798187356): Onset on 2013 at 53 years.  Comments:  2013 CDT 3:27 PM DARNELLT - [place] GRAY-C, Olivia  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  BRCA1 positive (1548471161)  Seasonal allergies (303837002)  Resolved  Atypical squamous cells of undetermined significance (ASCUS) on Papanicolaou smear of cervix (3829801872): Onset in the month of 2000 at 40 years  Resolved on 5/3/2010 at 50 years.  Cyst, ovary, dermoid (7302962495):  Resolved on 5/3/2010 at 50 years.  Comments:  5/3/2010  CDT 9:14 AM CDT - Justin Nicci  left oophorectomy  Pregnancy (114625191):  Resolved in 1985 at 24 years.  Pregnancy (920573515):  Resolved in 1987 at 26 years.  Pregnancy (385833081):  Resolved in 1990 at 29 years.  History of chicken pox (969651209):  Resolved.   Procedure history:    Right salpingo-oophorectomy (569444055) on 1/2/2015 at 54 Years.  Breast reconstruction (82210573) in the month of 4/2014 at 54 Years.  Simple mastectomy of right breast (3896055256) on 11/4/2013 at 53 Years.  Insertion of tunneled centrally inserted central venous access device, with subcutaneous port; age 5 years or older (66893) on 6/13/2013 at 53 Years.  Lumpectomy of breast (7127507382) on 5/24/2013 at 53 Years.  Biopsy of breast (596360243) on 5/2/2013 at 53 Years.  Comments:  7/1/2013 3:24 PM CDT - [place] NP-COlivia  Positive.  Colonoscopy (545782103) on 6/26/2012 at 52 Years.  Comments:  7/12/2012 10:22 AM CDT - Maye Hammonds MA  Normal repeat in 10 years  DEXA - Dual energy X-ray photon absorptiometry (583901511) on 4/23/2012 at 52 Years.  Comments:  4/26/2012 9:03 AM DARNELLT - [place] NP-COlivia  Normal. Just starting perimenopause.  HPV - Human papillomavirus test negative (1104890870) on 3/1/2009 at 49 Years.  Comments:  3/29/2011 3:21 PM CDT - [place] NP-COlivia  Low risk for cervical cancer. OK to have pap q 3 yrs.  Laparotomy (238302789) on 5/17/1999 at 39 Years.  Left oophorectomy (762540381) on 5/17/1999 at 39 Years.  Comments:  3/29/2011 3:24 PM CDT - [place] NP-COlivia  Dermoid cyst.  Colposcopy (9853498019).      Physical Examination   Vital Signs   3/4/2019 10:53 AM CST Temperature Tympanic 96.5 DegF  LOW    Peripheral Pulse Rate 44 bpm  LOW    Pulse Site Radial artery    Respiratory Rate 16 br/min    Systolic Blood Pressure 108 mmHg    Diastolic Blood Pressure 64 mmHg    Mean Arterial Pressure 79 mmHg    BP Site Right arm      Measurements from flowsheet : Measurements   3/4/2019 10:53 AM CST  Height Measured - Standard 64.5 in    Weight Measured - Standard 127 lb    BSA 1.62 m2    Body Mass Index 21.46 kg/m2      General:  No acute distress.    Musculoskeletal:  no swelling or deformation of right foot,  good posterior tibial and dorsal pedal pulses,  no tenderness over base of 5th metatarsal or over plantar fascia, there is some tenderness at base of 3rd toe  there is a callous with a soft center at plantar base of 3rd toe.       Review / Management   Radiology results   Results reviewed with patient.  Xray shows no fractures or dislocations.  Will be over-read by radiologist.  Will call if over-read has additional information.   Course:  corn is debrided with a 15 blade, covered with a bandage.       Impression and Plan   Diagnosis     Right foot pain ([id number] M79.671).     Corn of foot (UZY73-[id number] L84).     Summary:  foot pain may be due to the callous or other soft tissue injury, xrays look good today  she will be getting new orthotics from PT  advised to wear a corn pad to relieve pressure from the corn  if foot pain continue despite new orthotics and corn pad, then will refer to podiatry for further evaluation.    Orders     Orders   Requests (Radiology):  XR Foot Min 3 Views Right (Request) (Order): Right foot pain.     Orders   Charges (Evaluation and Management):  [id number] office outpatient visit 15 minutes (Charge) (Order): Quantity: 1, Right foot pain  Corn of foot.

## 2022-02-16 NOTE — NURSING NOTE
Urine Dipstick POC Entered On:  7/15/2021 8:21 AM CDT    Performed On:  7/14/2021 8:21 AM CDT by Herlinda Dai               Urine Dipstick POC   Urine Color Urine Dipstick :   Yellow   Urine Appearance Urine Dipstick :   Clear   Glucose Urine Dipstick :   Negative   Bilirubin Urine Dipstick :   Negative   Ketones Urine Dipstick :   Negative   Specific Gravity Urine Dipstick :   1.020   Blood Urine Dipstick :   Negative   pH Urine Dipstick :   5.5   Protein Urine Dipstick :   Negative   Urobilinogen Urine Dipstick :   0.2 mg/dl   Nitrite Urine Dipstick :   Negative   Leukocytes Urine Dipstick :   Negative   POC Test Comments :   Performed at [facility]   Herlinda Dai  7/15/2021 8:21 AM CDT

## 2022-02-16 NOTE — PROGRESS NOTES
Patient:   TONA LEONE            MRN: [id number]            FIN: [id number]               Age:   57 years     Sex:  Female     :  1960   Associated Diagnoses:   Bunion   Author:   Aníbal Bauer MD      Preoperative Information   Indication for surgery:  Bunion/cyst.    Accompanied by:  No one.    Source of history:  Self.           Chief Complaint   2017 3:09 PM CDT    Preop.  Cyst removal from right foot.  Dr. Lopes.  10/3/17.  [facility].        Review of Systems   Constitutional:  Negative.    Eye:  Negative.    Ear/Nose/Mouth/Throat:  Negative.    Respiratory:  Negative.    Cardiovascular:  Negative.    Gastrointestinal:  Negative.    Genitourinary:  Negative.    Hematology/Lymphatics:  Negative.    Endocrine:  Negative.    Immunologic:  Negative.    Musculoskeletal:  Negative.    Integumentary:  Negative.    Neurologic:  Negative.       Health Status   Allergies:    Allergic Reactions (Selected)  Severity Not Documented  Amoxicillin (Rash)  Penicillin (No reactions were documented)  Sulfa drug (No reactions were documented)   Medications:  (Selected)   Documented Medications  Documented  Daily Multiple Vitamins: 1 tab(s), po, daily, 0 Refill(s), Type: Maintenance  Fish Oil: po, daily, 0 Refill(s), Type: Maintenance  Iron Chews: ( 15 mg ), po, daily, 0 Refill(s), Type: Maintenance  Vitamin D3: po, daily, 0 Refill(s), Type: Maintenance   Problem list:    All Problems  BRCA1 positive / SNOMED CT 5462223436 / Confirmed  History of chicken pox / SNOMED CT 964280970 / Confirmed  Breast Cancer / SNOMED CT 2188274496 / Confirmed  Seasonal allergies / SNOMED CT 739371061 / Confirmed  Inactive: Axillary lymphadenopathy / SNOMED CT 076324  Resolved: Atypical squamous cells of undetermined significance (ASCUS) on Papanicolaou smear of cervix / SNOMED CT 0744191845  Resolved: Pregnancy / SNOMED CT 924689099  Resolved: Pregnancy / SNOMED CT 537473529  Resolved: Pregnancy / SNOMED CT  892656032  Resolved: Cyst, ovary, dermoid / SNOMED CT 2042115708      Histories   Past Medical History:    Active  Breast Cancer (9139475058): Onset on 2013 at 53 years.  Comments:  2013 CDT 3:27 PM CDT - [place] DAVIS Olivia  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  BRCA1 positive (8520714380)  Seasonal allergies (207521970)  History of chicken pox (131117258)  Resolved  Atypical squamous cells of undetermined significance (ASCUS) on Papanicolaou smear of cervix (8647754334): Onset in the month of 2000 at 40 years  Resolved on 5/3/2010 at 50 years.  Cyst, ovary, dermoid (6364580854):  Resolved on 5/3/2010 at 50 years.  Comments:  5/3/2010 CDT 9:14 AM CDT - Nicci Anderson  left oophorectomy  Pregnancy (936892420):  Resolved in  at 24 years.  Pregnancy (651267179):  Resolved in  at 26 years.  Pregnancy (142661397):  Resolved in  at 29 years.   Family History:    CA - Breast cancer  Grandmother (M) ()  Comments:  3/6/2013 2:43 PM - Adelaida Antonio  post menopausal  Diabetes mellitus  Brother  Breast cancer  Aunt (M)  Comments:  2013 4:09 PM - Olivia Cruz  Post-menopausal.  Aunt (P)  Comments:  2013 4:09 PM - [place] DAVIS Olivia  Post-menopausal.  Prediabetes  Mother ()  Pancreatic cancer  Mother ()  Diabetes mellitus type 2  Father  Grandmother (M) ()  High blood pressure  Brother  Brother  Myocardial infarction  Father  Stroke  Grandmother (P) ()  Cancer  Grandfather (P) ()  CABG - Coronary artery bypass graft  Father: onset at 75 .  Comments:  3/6/2013 2:46 PM - Adelaida Antonio  stents also  MI - Myocardial infarction  Mother (): onset at 78 .  Comments:  3/6/2013 2:47 PM - Adelaida Antonio  mild  Coronary artery disease  Mother ()  CAD - Coronary artery disease  Father     Procedure history:    Right salpingo-oophorectomy (SNOMED CT 794202866) performed by Gus Snell MD on 2015 at 54  Years.  Breast reconstruction (SNOMED CT 08288082) in the month of 4/2014 at 54 Years.  Simple mastectomy of right breast (SNOMED CT 8564449765) performed by Gus Andrade on 11/4/2013 at 53 Years.  Insertion of tunneled centrally inserted central venous access device, with subcutaneous port; age 5 years or older (CPT-4 69582) performed by Gus Andrade on 6/13/2013 at 53 Years.  Lumpectomy of breast (SNOMED CT 3496787481) on 5/24/2013 at 53 Years.  Biopsy of breast (SNOMED CT 299541875) on 5/2/2013 at 53 Years.  Comments:  7/1/2013 3:24 PM - [place] GRAY-COlivia  Positive.  Colonoscopy (SNOMED CT 348696854) performed by Lalito Pineda MD on 6/26/2012 at 52 Years.  Comments:  7/12/2012 10:22 AM - Maye Hammonds MA  Normal repeat in 10 years  DEXA - Dual energy X-ray photon absorptiometry (SNOMED CT 350612621) on 4/23/2012 at 52 Years.  Comments:  4/26/2012 9:03 AM - [place] NP-C, Olivia  Normal. Just starting perimenopause.  HPV - Human papillomavirus test negative (SNOMED CT 4486231516) on 3/1/2009 at 49 Years.  Comments:  3/29/2011 3:21 PM - [place] NP-COlivia  Low risk for cervical cancer. OK to have pap q 3 yrs.  Laparotomy (SNOMED CT 406765044) on 5/17/1999 at 39 Years.  Left oophorectomy (SNOMED CT 260037566) on 5/17/1999 at 39 Years.  Comments:  3/29/2011 3:24 PM - [place] NP-COlivia  Dermoid cyst.  Colposcopy (SNOMED CT 2005698990).   Social History:        Alcohol Assessment            Current, Daily, 1 drinks/episode average.  2 drinks/episode maximum.      Tobacco Assessment            Never smoker      Substance Abuse Assessment            Never      Employment and Education Assessment            Employed, Work/School description: .      Home and Environment Assessment            Marital status: .  Spouse/Partner name: Dilshad.  Risks in environment: owns secured gun.      Nutrition and Health Assessment            Type of diet: Regular.      Exercise and Physical  Activity Assessment            Exercise frequency: 6 times/week.  Exercise type: Running, Weight lifting.      Sexual Assessment            Sexually active: Yes.  Sexual orientation: Heterosexual.  Contraceptive Use Details:  had vasectomy.       Has  no history of anemia.  Has  no history of DVT or pulmonary embolism.  Has  no personal history of bleeding problems.   Has  no personal history of anesthesia reactions.  Patient does not have active tuberculosis.    S/he has not taken aspirin or aspirin containing products in the last week.     S/he  has not taken Plavix (Clopidogrel) in the last 2 weeks.    S/he  has not taken warfarin in the past week.    S/he  has not been on corticosteroids for more than 2 weeks recently.      S/he  is not DNR before, during or after surgery.         Physical Examination   Vital Signs   9/20/2017 3:09 PM CDT Temperature Tympanic 98.3 DegF    Peripheral Pulse Rate 48 bpm  LOW    HR Method Electronic    Systolic Blood Pressure 122 mmHg    Diastolic Blood Pressure 68 mmHg    Mean Arterial Pressure 86 mmHg    BP Method Electronic      Measurements from flowsheet : Measurements   9/20/2017 3:09 PM CDT Height Measured - Standard 64.25 in    Weight Measured - Standard 124.0 lb    BSA 1.6 m2    Body Mass Index 21.12 kg/m2      General:  Alert and oriented, No acute distress.    Eye:  Normal conjunctiva.    HENT:  Normocephalic, Tympanic membranes are clear, Oral mucosa is moist, No pharyngeal erythema.    Neck:  Supple, Non-tender, No lymphadenopathy, No thyromegaly.    Respiratory:  Breath sounds are equal, Symmetrical chest wall expansion.         Respirations: Are within normal limits.         Pattern: Regular.         Breath sounds: Bilateral, Within normal limits.    Cardiovascular:  Normal rate, Regular rhythm, No murmur, Normal peripheral perfusion, No edema.    Gastrointestinal:  Soft, Non-tender, Non-distended, Normal bowel sounds, No organomegaly.    Genitourinary:  No  costovertebral angle tenderness.    Integumentary:  Warm, Dry, No rash.    Neurologic:  Alert, Oriented.       Impression and Plan   Diagnosis     Bunion (WON14-WB M21.619).     Condition:  ok for surgery asa1.

## 2022-02-16 NOTE — TELEPHONE ENCOUNTER
---------------------  From: Nas Gonzalez MD   To: TONA LEONE    Sent: 10/26/2021 1:31:25 PM CDT  Subject: lab results     Tona,  Your labs are very good. We'll discuss at your visit on the 1st.   Anny Gonzalez      Results:  Date Result Name Ind Value Ref Range   10/25/2021 8:07 AM Cholesterol ((H)) 219 mg/dL ( - <200)   10/25/2021 8:07 AM HDL  78 mg/dL (> OR = 50 - )   10/25/2021 8:07 AM Triglyceride  61 mg/dL ( - <150)   10/25/2021 8:07 AM LDL ((H)) 126    10/25/2021 8:07 AM Cholesterol/HDL Ratio  2.8 ( - <5.0)   10/25/2021 8:07 AM Non-HDL Cholesterol ((H)) 141 ( - <130)   10/25/2021 8:07 AM Glucose Level  85 mg/dL (65 - 99)

## 2022-02-16 NOTE — NURSING NOTE
Hearing and Vision Screening Entered On:  10/12/2020 9:28 AM CDT    Performed On:  10/12/2020 9:28 AM CDT by Anel Vasquez CMA               Hearing and Vision Screening   Audiogram Result Right Ear :   Pass   Audiogram Result Left Ear :   Pass   Anel Vasquez CMA - 10/12/2020 9:28 AM CDT

## 2022-02-16 NOTE — NURSING NOTE
CAGE Assessment Entered On:  10/12/2020 12:58 PM CDT    Performed On:  10/12/2020 12:58 PM CDT by Anel Vasquez CMA               Assessment   Have you ever felt you should cut down on your drinking :   No   Have people annoyed you by criticizing your drinking :   No   Have you ever felt bad or guilty about your drinking :   No   Have you ever taken a drink first thing in the morning to steady your nerves or get rid of a hangover (Eye-opener) :   No   CAGE Score :   0    Anel Vasquez CMA - 10/12/2020 12:58 PM CDT

## 2022-02-16 NOTE — NURSING NOTE
Comprehensive Intake Entered On:  10/12/2020 9:27 AM CDT    Performed On:  10/12/2020 9:23 AM CDT by Anel Vasquez CMA               Summary   Chief Complaint :   Annual px   Menstrual Status :   Postmenopausal   Weight Measured :   124 lb(Converted to: 124 lb 0 oz, 56.245 kg)    Height Measured :   64.5 in(Converted to: 5 ft 4 in, 163.83 cm)    Body Mass Index :   20.95 kg/m2   Body Surface Area :   1.6 m2   Systolic Blood Pressure :   138 mmHg (HI)    Diastolic Blood Pressure :   72 mmHg   Mean Arterial Pressure :   94 mmHg   Peripheral Pulse Rate :   38 bpm (LOW)    BP Site :   Right arm   BP Method :   Manual   Temperature Tympanic :   97.3 DegF(Converted to: 36.3 DegC)  (LOW)    Oxygen Saturation :   98 %   Race :      Languages :   English   Ethnicity :   Not  or    Anel Vasquez CMA - 10/12/2020 9:23 AM CDT   Health Status   Allergies Verified? :   Yes   Medication History Verified? :   Yes   Immunizations Current :   Yes   Pre-Visit Planning Status :   Completed   Tobacco Use? :   Never smoker   Anel Vasquez CMA - 10/12/2020 9:23 AM CDT   Meds / Allergies   (As Of: 10/12/2020 9:27:18 AM CDT)   Allergies (Active)   amoxicillin  Estimated Onset Date:   Unspecified ; Reactions:   Rash ; Created By:   Nicci Anderson; Reaction Status:   Active ; Substance:   amoxicillin ; Updated By:   iNcci Anderson; Reviewed Date:   3/4/2019 10:59 AM CST      penicillin  Estimated Onset Date:   Unspecified ; Created By:   Maye Hammonds MA; Reaction Status:   Active ; Substance:   penicillin ; Updated By:   Maye Hammonds MA; Reviewed Date:   3/4/2019 10:59 AM CST      sulfa drug  Estimated Onset Date:   Unspecified ; Created By:   Maye Hammonds MA; Reaction Status:   Active ; Category:   Drug ; Substance:   sulfa drug ; Type:   Allergy ; Updated By:   Maye Hammonds MA; Reviewed Date:   3/4/2019 10:59 AM CST        Medication List   (As Of: 10/12/2020 9:27:18 AM CDT)   Home Meds    carbonyl iron  :    carbonyl iron ; Status:   Documented ; Ordered As Mnemonic:   Iron Chews ; Simple Display Line:   15 mg, po, daily, 0 Refill(s) ; Catalog Code:   carbonyl iron ; Order Dt/Tm:   9/20/2017 3:11:49 PM CDT          cholecalciferol  :   cholecalciferol ; Status:   Documented ; Ordered As Mnemonic:   Vitamin D3 ; Simple Display Line:   po, daily, 0 Refill(s) ; Catalog Code:   cholecalciferol ; Order Dt/Tm:   5/9/2016 12:20:32 PM CDT          multivitamin  :   multivitamin ; Status:   Documented ; Ordered As Mnemonic:   Daily Multiple Vitamins ; Simple Display Line:   1 tab(s), po, daily, 0 Refill(s) ; Catalog Code:   multivitamin ; Order Dt/Tm:   5/9/2016 12:20:13 PM CDT          omega-3 polyunsaturated fatty acids  :   omega-3 polyunsaturated fatty acids ; Status:   Documented ; Ordered As Mnemonic:   Fish Oil ; Simple Display Line:   po, daily, 0 Refill(s) ; Catalog Code:   omega-3 polyunsaturated fatty acids ; Order Dt/Tm:   5/9/2016 12:20:18 PM CDT            ID Risk Screen   Recent Travel History :   No recent travel   Family Member Travel History :   No recent travel   Other Exposure to Infectious Disease :   Unknown   Anel Vasquez CMA - 10/12/2020 9:23 AM CDT

## 2022-02-16 NOTE — PROGRESS NOTES
Patient:   TONA LEONE            MRN: [id number]            FIN: [id number]               Age:   58 years     Sex:  Female     :  1960   Associated Diagnoses:   Abscessed tooth   Author:   Aníbal Bauer MD      Visit Information      Date of Service: 2018 12:00 pm  Performing Location: [facility]  Encounter#: [id number]      Primary Care Provider (PCP):  Aníbal Bauer MD    NPI# [id number]      Referring Provider:  Aníbal Bauer MD# [id number]      Chief Complaint   2018 12:09 PM CDT   Tooth infection, treated yesterday.  Now swelling is moving into neck.  Unsure if from infection or allergic reaction.        History of Present Illness   chief complaint and symptoms as noted above confirmed with patient   having root canal in am  take 2 doses clinda 150 mg so far  no fever      Review of Systems   Constitutional:  Negative except as documented in history of present illness.    Ear/Nose/Mouth/Throat:  Negative except as documented in history of present illness.    Respiratory:  Negative.    Integumentary:  Negative.    Neurologic:  Negative.       Health Status   Allergies:    Allergic Reactions (Selected)  Severity Not Documented  Amoxicillin (Rash)  Penicillin (No reactions were documented)  Sulfa drug (No reactions were documented)   Medications:  (Selected)   Prescriptions  Prescribed  clindamycin 150 mg oral capsule: 3 cap(s) ( 450 mg ), PO, q6hr, # 120 cap(s), 0 Refill(s), Type: Maintenance, Pharmacy: [facility], 3 cap(s) po q6 hrs,x10 day(s)  Documented Medications  Documented  Daily Multiple Vitamins: 1 tab(s), po, daily, 0 Refill(s), Type: Maintenance  Fish Oil: po, daily, 0 Refill(s), Type: Maintenance  Iron Chews: ( 15 mg ), po, daily, 0 Refill(s), Type: Maintenance  Vitamin D3: po, daily, 0 Refill(s), Type: Maintenance  clindamycin 150 mg oral capsule: 1 cap(s) ( 150 mg ), po, qid, Instructions: Rx'd by dentist for tooth infection., 0 Refill(s),  Type: Maintenance,    Medications          *denotes recorded medication          *Iron Chews: 15 mg, po, daily, 0 Refill(s).          *Vitamin D3: po, daily, 0 Refill(s).          *clindamycin 150 mg oral capsule: 150 mg, 1 cap(s), po, qid, Rx'd by dentist for tooth infection., 0 Refill(s).          clindamycin 150 mg oral capsule: 450 mg, 3 cap(s), PO, q6hr, for 10 day(s), 120 cap(s), 0 Refill(s).          *Daily Multiple Vitamins: 1 tab(s), po, daily, 0 Refill(s).          *Fish Oil: po, daily, 0 Refill(s).     Problem list:    All Problems (Selected)  BRCA1 positive / SNOMED CT 8899022333 / Confirmed  History of breast cancer / SNOMED CT 7643477156 / Confirmed  Seasonal allergies / SNOMED CT 373731674 / Confirmed      Histories   Past Medical History:    Active  History of breast cancer (7443594717): Onset on 2013 at 53 years.  Comments:  2013 CDT 3:27 PM CDT - Camden GRAY-Olivia MCLEAN  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  BRCA1 positive (7796938895)  Seasonal allergies (992874752)  Resolved  Atypical squamous cells of undetermined significance (ASCUS) on Papanicolaou smear of cervix (2242544894): Onset in the month of 2000 at 40 years  Resolved on 5/3/2010 at 50 years.  Cyst, ovary, dermoid (5105569043):  Resolved on 5/3/2010 at 50 years.  Comments:  5/3/2010 CDT 9:14 AM CDT - Nicci Anderson  left oophorectomy  Pregnancy (689273852):  Resolved in  at 24 years.  Pregnancy (602956818):  Resolved in  at 26 years.  Pregnancy (968103495):  Resolved in  at 29 years.  History of chicken pox (183696004):  Resolved.   Family History:    CA - Breast cancer  Grandmother (M) ()  Comments:  3/6/2013 2:43 PM - Adelaida Antonio  post menopausal  Diabetes mellitus  Brother  Breast cancer  Aunt (M)  Comments:  2013 4:09 PM - Olivia Cruz  Post-menopausal.  Aunt (P)  Comments:  2013 4:09 PM - Olivia Cruz  Post-menopausal.  Prediabetes  Mother  ()  Pancreatic cancer  Mother ()  Diabetes mellitus type 2  Father  Grandmother (M) ()  High blood pressure  Brother  Brother  Myocardial infarction  Father  Stroke  Grandmother (P) ()  Cancer  Grandfather (P) ()  CABG - Coronary artery bypass graft  Father: onset at 75 .  Comments:  3/6/2013 2:46 PM - Adelaida Antonio  stents also  MI - Myocardial infarction  Mother (): onset at 78 .  Comments:  3/6/2013 2:47 PM - Adelaida Antonio  mild  Coronary artery disease  Mother ()  CAD - Coronary artery disease  Father     Procedure history:    Right salpingo-oophorectomy (SNOMED CT 612098241) performed by Gus Snell MD on 2015 at 54 Years.  Breast reconstruction (SNOMED CT 88715298) in the month of 2014 at 54 Years.  Simple mastectomy of right breast (SNOMED CT 1633306328) performed by Gus Andrade on 2013 at 53 Years.  Insertion of tunneled centrally inserted central venous access device, with subcutaneous port; age 5 years or older (CPT-4 81350) performed by Gus Andrade on 2013 at 53 Years.  Lumpectomy of breast (SNOMED CT 7998613228) on 2013 at 53 Years.  Biopsy of breast (SNOMED CT 282613042) on 2013 at 53 Years.  Comments:  2013 3:24 PM - Olivia Cruz  Positive.  Colonoscopy (SNOMED CT 109918819) performed by Lalito Pineda MD on 2012 at 52 Years.  Comments:  2012 10:22 AM - Maye Hammonds MA  Normal repeat in 10 years  DEXA - Dual energy X-ray photon absorptiometry (SNOMED CT 666464912) on 2012 at 52 Years.  Comments:  2012 9:03 AM - Olivia Cruz  Normal. Just starting perimenopause.  HPV - Human papillomavirus test negative (SNOMED CT 6599792981) on 3/1/2009 at 49 Years.  Comments:  3/29/2011 3:21 PM - Olivia Cruz  Low risk for cervical cancer. OK to have pap q 3 yrs.  Laparotomy (SNOMED CT 548967527) on 1999 at 39 Years.  Left oophorectomy (SNOMED CT 964316613)  on 5/17/1999 at 39 Years.  Comments:  3/29/2011 3:24 PM - Camden NP-C, Olivia  Dermoid cyst.  Colposcopy (SNOMED CT 3203781743).   Social History:        Alcohol Assessment            Current, Daily, 1 drinks/episode average.  2 drinks/episode maximum.      Tobacco Assessment            Never smoker      Substance Abuse Assessment            Never      Employment and Education Assessment            Employed, Work/School description: .      Home and Environment Assessment            Marital status: .  Spouse/Partner name: Dilshad.  Risks in environment: owns secured gun.      Nutrition and Health Assessment            Type of diet: Regular.      Exercise and Physical Activity Assessment            Exercise frequency: 6 times/week.  Exercise type: Running, Weight lifting.      Sexual Assessment            Sexually active: Yes.  Sexual orientation: Heterosexual.  Contraceptive Use Details:  had vasectomy.        Physical Examination   Vital Signs   4/11/2018 12:09 PM CDT Temperature Tympanic 97.2 DegF  LOW    Peripheral Pulse Rate 39 bpm  LOW    HR Method Electronic    Systolic Blood Pressure 144 mmHg  HI    Diastolic Blood Pressure 65 mmHg    Mean Arterial Pressure 91 mmHg    BP Method Electronic    Oxygen Saturation 99 %      Measurements from flowsheet : Measurements   4/11/2018 12:09 PM CDT   Weight Measured - Standard                127.0 lb     General:  Alert and oriented, No acute distress.    HENT:  No pharyngeal erythema.    Neck:  Supple, swelling left neck no reddness or incr warmth, swelling tenderness left mid mandible.    Respiratory:  Respirations are non-labored.    Cardiovascular:  Regular rhythm.       Impression and Plan   Diagnosis     Abscessed tooth (UMF56-VC K04.7).     Course:  Not progressing as expected.    Plan:  increase clinda  root canal in  am  call if worse.    Patient Instructions:       Counseled: Patient, Regarding diagnosis, Regarding treatment, Regarding  medications.

## 2022-02-16 NOTE — PROGRESS NOTES
Patient:   TONA LEONE            MRN: [id number]            FIN: [id number]               Age:   57 years     Sex:  Female     :  1960   Associated Diagnoses:   Bacterial pneumonia   Author:   Aníbal Bauer MD      Visit Information      Primary Care Provider (PCP):  Aníbal Bauer MD    NPI# [id number]      Chief Complaint   3/22/2017 2:22 PM CDT    Cough, congestion x1 week.  Fever x 24 hours.     Upper Respiratory Symptoms      History of Present Illness             The patient presents with symptoms of an upper respiratory infection and chief complaint and symptoms as noted above confirmed with patient .  The symptoms of the upper respiratory infection are described as rhinorrhea, sore throat, nasal congestion, cough, yellow sputum and green sputum.  The severity of the symptoms associated to the upper respiratory infection is moderate.  The timing/course of upper respiratory infection symptoms fluctuates in intensity.  The symptoms of upper respiratory infection have lasted for 2 week(s).  Exacerbating factors consist of none.  Relieving factors consist of none.  Associated symptoms consist of none.        Review of Systems   Constitutional:  Fatigue.    Eye:  Negative.    Ear/Nose/Mouth/Throat:  Nasal congestion.    Respiratory:  Cough, Sputum production, No shortness of breath, No wheezing.    Cardiovascular:  Negative.    Gastrointestinal:  Negative.    Genitourinary:  Negative.    Hematology/Lymphatics:  Negative.    Endocrine:  Negative.    Immunologic:  Negative.    Musculoskeletal:  Negative.    Integumentary:  Negative, No rash.    Neurologic:  Negative.    Psychiatric:  Negative.          All other systems reviewed and negative      Health Status   Allergies:    Allergic Reactions (Selected)  Severity Not Documented  Amoxicillin (Rash)  Penicillin (No reactions were documented)  Sulfa drug (No reactions were documented)   Problem list:    All Problems  Breast Cancer / SNOMED CT  6435296658 / Confirmed  BRCA1 Positive / ICD-9-CM V84.01 / Confirmed  Inactive: Axillary Lymphadenopathy / ICD-9-.6  Resolved: Cyst, Ovary, Dermoid / ICD-9-  Resolved: ASCUS (Atypical Squamous Cells of Undetermined Significance) on Pap Smear / ICD-9-.01  Resolved: Pregnancy / SNOMED CT 792758869  Resolved: Pregnancy / SNOMED CT 394889210  Resolved: Pregnancy / SNOMED CT 900441102   Medications:  (Selected)   Prescriptions  Prescribed  Levaquin 500 mg oral tablet: 1 tab(s) ( 500 mg ), PO, q24hr, # 10 tab(s), 0 Refill(s), Type: Maintenance, Pharmacy: [facility], 1 tab(s) po q 24 hrs,x10 day(s)  Documented Medications  Documented  Daily Multiple Vitamins: 1 tab(s), po, daily, 0 Refill(s), Type: Maintenance  Fish Oil: po, daily, 0 Refill(s), Type: Maintenance  Vitamin D3: po, daily, 0 Refill(s), Type: Maintenance      Histories   Past Medical History:    Active  Breast Cancer (1828954142): Onset on 2013 at 53 years.  Comments:  2013 CDT 3:27 PM CDT - Olivia Cruz  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  BRCA1 Positive (V84.01)  Resolved  ASCUS (Atypical Squamous Cells of Undetermined Significance) on Pap Smear (795.01): Onset in the month of 2000 at 40 years  Resolved on 5/3/2010 at 50 years.  Cyst, Ovary, Dermoid (220):  Resolved on 5/3/2010 at 50 years.  Comments:  5/3/2010 CDT 9:14 AM CDT - Nicci Anderson  left oophorectomy  Pregnancy (909790439):  Resolved in  at 24 years.  Pregnancy (090345519):  Resolved in  at 26 years.  Pregnancy (833971560):  Resolved in  at 29 years.   Family History:    CA - Breast cancer  Grandmother (M) ()  Comments:  3/6/2013 2:43 PM - Adelaida Antonio  post menopausal  Diabetes mellitus  Brother  Breast cancer  Aunt (M)  Comments:  2013 4:09 PM - Olivia Cruz  Post-menopausal.  Aunt (P)  Comments:  2013 4:09 PM - Olivia Cruz  Post-menopausal.  Prediabetes  Mother ()  Pancreatic  cancer  Mother ()  Diabetes mellitus type 2  Father  Grandmother (M) ()  High blood pressure  Brother  Brother  Myocardial infarction  Father  Stroke  Grandmother (P) ()  Cancer  Grandfather (P) ()  CABG - Coronary artery bypass graft  Father: onset at 75 .  Comments:  3/6/2013 2:46 PM - Adelaida Antonio  stents also  MI - Myocardial infarction  Mother (): onset at 78 .  Comments:  3/6/2013 2:47 PM - Adelaida Antonio  mild  Coronary artery disease  Mother ()  CAD - Coronary artery disease  Father     Procedure history:    Right salpingo-oophorectomy (SNOMED CT 336000148) performed by Gus Snell MD on 2015 at 54 Years.  Breast reconstruction (SNOMED CT 98816434) in the month of 2014 at 54 Years.  Simple mastectomy of right breast (SNOMED CT 9525961369) performed by Gus Andrade on 2013 at 53 Years.  Insertion of tunneled centrally inserted central venous access device, with subcutaneous port; age 5 years or older (CPT-4 14529) performed by Gus Andrade on 2013 at 53 Years.  Lumpectomy of breast (SNOMED CT 7992698346) on 2013 at 53 Years.  Biopsy of breast (SNOMED CT 012084900) on 2013 at 53 Years.  Comments:  2013 3:24 PM - Camden GRAY-Olivia MCLEAN  Positive.  Colonoscopy (SNOMED CT 963225760) performed by Lalito Pineda MD on 2012 at 52 Years.  Comments:  2012 10:22 AM - Maye Hammonds MA  Normal repeat in 10 years  DEXA - Dual energy X-ray photon absorptiometry (SNOMED CT 510210305) on 2012 at 52 Years.  Comments:  2012 9:03 AM - Olivia Cruz  Normal. Just starting perimenopause.  HPV - Human papillomavirus test negative (SNOMED CT 0431188185) on 3/1/2009 at 49 Years.  Comments:  3/29/2011 3:21 PM - Olivia Cruz  Low risk for cervical cancer. OK to have pap q 3 yrs.  Laparotomy (SNOMED CT 663190718) on 1999 at 39 Years.  Left oophorectomy (SNOMED CT 375878479) on 1999 at 39  Years.  Comments:  3/29/2011 3:24 PM - Camden GRAY-C, Olivia  Dermoid cyst.  Colposcopy (SNOMED CT 9643239083).   Social History:        Alcohol Assessment: Current            3-5 times per week                     Comments:                      06/02/2016 - Adelaida Antonio                     1-2 per time.      Tobacco Assessment: Denies Tobacco Use      Substance Abuse Assessment: Denies Substance Abuse      Employment and Education Assessment            Employed, Work/School description: .      Exercise and Physical Activity Assessment            Exercise frequency: 6 times/week.  Exercise type: Running, Weight lifting.      Sexual Assessment            Sexually active: Yes.  Number of current partners 1.  Sexual orientation: Heterosexual..  Other contraceptive               use:  vasectomy..                     Comments:                      03/29/2011 - Camden GRAY-VINAY, Olivia                     .        Physical Examination   Vital Signs   3/22/2017 2:22 PM CDT Temperature Tympanic 98.7 DegF    Peripheral Pulse Rate 58 bpm  LOW    Systolic Blood Pressure 106 mmHg    Diastolic Blood Pressure 66 mmHg    Mean Arterial Pressure 79 mmHg      Measurements from flowsheet : Measurements   3/22/2017 2:22 PM CDT    Weight Measured - Standard                121.0 lb     General:  Alert and oriented, No acute distress.    Eye:  Normal conjunctiva.    HENT:  Normocephalic, Tympanic membranes are clear, Oral mucosa is moist, No pharyngeal erythema.    Neck:  Supple, Non-tender, No lymphadenopathy, No thyromegaly.    Respiratory:  Breath sounds are equal, Symmetrical chest wall expansion.         Respirations: Are within normal limits.         Pattern: Regular.         Breath sounds: Left, Base, Crackles present.    Cardiovascular:  Normal rate, Regular rhythm, No murmur, Normal peripheral perfusion, No edema.    Gastrointestinal:  Soft, Non-tender, Non-distended, Normal bowel sounds, No  organomegaly.    Genitourinary:  No costovertebral angle tenderness.    Integumentary:  Warm, Dry, No rash.       Impression and Plan   Diagnosis     Bacterial pneumonia (HFX32-AL J15.9).     Course:  Not progressing as expected.    Plan:  levaquin  fu 1 week if not better sooner if worse.         Refer to: Reviewed when to return to clinic and anticipatory guidance. Also reviewed to return sooner if no improvement or worsening.    Patient Instructions:       Counseled: Patient, Regarding diagnosis, Regarding treatment, Regarding medications, Regarding activity, Verbalized understanding.    Orders

## 2022-02-16 NOTE — TELEPHONE ENCOUNTER
"---------------------  From: Jihan Estrada LPN (Phone Messages Pool ([id number]_[facility]))   To: [facility] Message Pool ([id number]_[facility]);     Sent: 3/20/2019 1:23:49 PM CDT  Subject: physical therapy- foot     Phone Message    PCP:   TFS asked for [facility]      Time of Call:  12:18pm       Person Calling:  [facility]  Phone number:  [phone]    Note:   Radha LM stating pt had seen [facility] the beginning of March for her right foot/heel. Radha says they started PT on this foot on February 19. Radha says in general pt's insurance does not require a physician order but they are asking Radha to send them their records for medical necessity.     Radha says pt was seen for 8 visits and completed PT on 3/19/19 with success.     Radha is asking for office visit note from when she seen [facility] and as well as imaging that she believes was done. Radha is wondering if [facility] could either sign their plan of care or give an order for PT.    Last office visit and reason:  3/4/19 right foot pain, corn with DWGDave what would you like to do? order for PT or sign their plan of care?  Kofi Huertas CMA---------------------  From: Kofi Huertas CMA ([facility] Message Pool ([id number]_[facility]))   To: Mc Melendez PA-C;     Sent: 3/20/2019 2:00:02 PM CDT  Subject: FW: physical therapy- foot---------------------  From: Mc Melendez PA-C   To: [facility] Message Pool ([id number]_[facility]);     Sent: 3/20/2019 2:08:34 PM CDT  Subject: RE: physical therapy- foot     whatever is easier,  if PT order works, then okay to do that  ** Submitted: **  Order:Referral (Request)  Continue Therapy for an extra 2-3 visits.  Details:  3/20/2019 3:04 PM CDT, Referred to: Physical Therapy, Additional instructions: Continue Therapy for an extra 2-3 visits., Right foot pain         Signed by Kofi Huertas CMA  3/20/2019 3:04:00 PM        I called [facility] and talked with \"Mac\" and he states they would like a PT order that states " "\"continue Therapy for and extra 2-3 visits\"  and then fax to him at [phone] Attn: Mac.PT referral ordered per [facility] and faxed to Mac at [facility] [phone] confirmation received.  Kofi Huertas CMA  "

## 2022-02-16 NOTE — TELEPHONE ENCOUNTER
---------------------  From: Serina Glaser CMA (Phone Messages Pool ([id number]_[facility]))   To: [facility] Message Pool (32224_[place] [facility] [place]);     Sent: 8/31/2021 11:29:40 AM CDT  Subject: General Message-Covid testing     Phone Message    PCP:   [facility]      Time of Call:  1015       Person Calling:  self  Phone number:  [phone]    Note:  pt going to [place] 9/10 - [place] have a covid test [place].  Please advise.  ? Tues/Wed next wk    Last office visit and reason:  7/14 vaginitisOk per TFS. Order placed. Pt asymptomatic, will schedule lab appt.

## 2022-02-16 NOTE — PROGRESS NOTES
Patient:   TONA LEONE            MRN: [id number]            FIN: [id number]               Age:   60 years     Sex:  Female     :  1960   Associated Diagnoses:   Well adult exam; Postmenopausal; Screening for cardiovascular condition; Screening for diabetes mellitus   Author:   Nas Gonzalez MD      Visit Information   Visit type:  Annual exam.    Source of history:  Self.    History limitation:  None.       Chief Complaint   10/12/2020 9:23 AM CDT   Annual px      Well Adult History   Well Adult History             The patient presents for well adult exam.  The patient's general health status is described as good.  The patient's diet is described as balanced.  Exercise: routine, aerobic activity.  Associated symptoms consist of none.  Last menstrual period: postmenopausal.  Medical encounters: sees Dr. Wong annually. He recommended repeat DEXA. Also had slightly elevated glucose on non-fasting labs. Would like to do screening fasting labs today..  Additional pertinent history: no further mammograms due to bilateral mastectomy and colonoscopy due , pap due .        Review of Systems   Eye:  vision changes noted, recent visit to eye doctor.    All other systems reviewed and negative      Health Status   Allergies:    Allergic Reactions (Selected)  Severity Not Documented  Amoxicillin (Rash)  Penicillin (No reactions were documented)  Sulfa drug (No reactions were documented)   Medications:  (Selected)   Documented Medications  Documented  Daily Multiple Vitamins: 1 tab(s), po, daily, 0 Refill(s), Type: Maintenance  Fish Oil: po, daily, 0 Refill(s), Type: Maintenance  Iron Chews: ( 15 mg ), po, daily, 0 Refill(s), Type: Maintenance  Vitamin D3: po, daily, 0 Refill(s), Type: Maintenance   Problem list:    All Problems  BRCA1 positive / SNOMED CT 4503480348 / Confirmed  History of breast cancer / SNOMED CT 8319599219 / Confirmed  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  Seasonal allergies  / SNOMED CT 573844541 / Confirmed  Inactive: Axillary lymphadenopathy / SNOMED CT 920836  benign right  Resolved: Atypical squamous cells of undetermined significance (ASCUS) on Papanicolaou smear of cervix / SNOMED CT 0289443189  Resolved: Cyst, ovary, dermoid / SNOMED CT 6326957348  left oophorectomy  Resolved: History of chicken pox / SNOMED CT 655297687  Resolved: Pregnancy / SNOMED CT 995108993  Resolved: Pregnancy / SNOMED CT 465612639  Resolved: Pregnancy / SNOMED CT 892679992      Histories   Past Medical History:    Active  History of breast cancer (SNOMED CT 8052160340): Onset on 2013 at 53 years.  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  BRCA1 positive (SNOMED CT 5878090731)  Seasonal allergies (SNOMED CT 695320457)  Resolved  Atypical squamous cells of undetermined significance (ASCUS) on Papanicolaou smear of cervix (SNOMED CT 0537622093): Onset in the month of 2000 at 40 years  Resolved on 5/3/2010 at 50 years.  Cyst, ovary, dermoid (SNOMED CT 9016117957):  Resolved on 5/3/2010 at 50 years.  Comments:  5/3/2010 CDT 9:14 AM CDT - Nicci Anderson  left oophorectomy  Pregnancy (SNOMED CT 945682769):  Resolved on 1985 at 25 years.  Pregnancy (SNOMED CT 555062582):  Resolved on 5/3/1987 at 27 years.  Pregnancy (SNOMED CT 196625381):  Resolved on 1990 at 30 years.  History of chicken pox (SNOMED CT 983868580):  Resolved.   Family History:    CA - Breast cancer  Grandmother (M) ()  Comments:  3/6/2013 2:43 PM CST - Adelaida Antonio  post menopausal  Diabetes mellitus  Brother  Breast cancer  Aunt (M)  Comments:  2013 4:09 PM CDT - Cam[place] NP-C, Olivia  Post-menopausal.  Aunt (P)  Comments:  2013 4:09 PM CDT - Parvezl NP-C, Olivia  Post-menopausal.  Prediabetes  Mother ()  Pancreatic cancer  Mother ()  Diabetes mellitus type 2  Father  Grandmother (M) ()  High blood  pressure  Brother  Brother  Father  Arthritis  Mother ()  Father  Myocardial infarction  Father  Stroke  Grandmother (P) ()  CABG - Coronary artery bypass graft  Father: onset at 75 .  Comments:  3/6/2013 2:46 PM Adelaida Bernal  stents also  MI - Myocardial infarction  Mother (): onset at 78 .  Comments:  3/6/2013 2:47 PM Adelaida Bernal  mild  Coronary artery disease  Mother ()  CAD - Coronary artery disease  Father  Cancer in situ of prostate  Grandfather (P) ()  High cholesterol  Father     Procedure history:    Date of last PAP test (2779876462) on 5/15/2017 at 57 Years.  Right salpingo-oophorectomy (975859724) on 2015 at 54 Years.  Breast reconstruction (73715175) in the month of 2014 at 54 Years.  Simple mastectomy of right breast (6862136188) on 2013 at 53 Years.  Insertion of tunneled centrally inserted central venous access device, with subcutaneous port; age 5 years or older (76939) on 2013 at 53 Years.  Lumpectomy of breast (7943778028) on 2013 at 53 Years.  Biopsy of breast (467242440) on 2013 at 53 Years.  Comments:  2013 3:24 PM DARNELLT - Camden GRAY-COlivia  Positive.  Colonoscopy (746692615) on 2012 at 52 Years.  Comments:  2012 10:22 AM CDT - Maye Hammonds MA  Normal repeat in 10 years  DEXA - Dual energy X-ray photon absorptiometry (414029007) on 2012 at 52 Years.  Comments:  2012 9:03 AM DARNELLT - Camden GRAY-C Olivia  Normal. Just starting perimenopause.  HPV - Human papillomavirus test negative (7145091052) on 3/1/2009 at 49 Years.  Comments:  3/29/2011 3:21 PM DARNELLT - Camden GRAY-COlivia  Low risk for cervical cancer. OK to have pap q 3 yrs.  Laparotomy (664002173) on 1999 at 39 Years.  Left oophorectomy (086315187) on 1999 at 39 Years.  Comments:  3/29/2011 3:24 PM DARNELLT - Camden DAVIS, Olivia  Dermoid cyst.  Colposcopy (6408598948).   Social History:        Alcohol Assessment             Current, Beer (12 oz), Wine (5 oz), 3-5 times per week, 1 drinks/episode average.  2 drinks/episode maximum.               Ready to change: No.      Tobacco Assessment            Never smoker      Substance Abuse Assessment            Never      Employment and Education Assessment            Employed, Work/School description: [employer].  Highest education level: University degree(s).      Home and Environment Assessment            Marital status: .  Spouse/Partner name: Dilshad.  Mariella children.  Living situation: Home/Independent.               Injuries/Abuse/Neglect in household: No.  Feels unsafe at home: No.  Family/Friends available for support:               Yes.  Risks in environment: Stairs, owns secured gun.      Nutrition and Health Assessment            Type of diet: Regular.  Wants to lose weight: No.  Sleeping concerns: No.  Feels highly stressed: No.      Exercise and Physical Activity Assessment            Exercise frequency: 5-6 times/week.  Exercise type: Running, Weight lifting.      Sexual Assessment            Sexually active: Yes.  Identifies as female, Sexual orientation: Straight or heterosexual.  History of STD:               No.  History of sexual abuse: No.        Physical Examination   Vital Signs   10/12/2020 9:23 AM CDT Temperature Tympanic 97.3 DegF  LOW    Peripheral Pulse Rate 38 bpm  LOW    Systolic Blood Pressure 138 mmHg  HI    Diastolic Blood Pressure 72 mmHg    Mean Arterial Pressure 94 mmHg    BP Site Right arm    BP Method Manual    Oxygen Saturation 98 %      Measurements from flowsheet : Measurements   10/12/2020 9:23 AM CDT Height Measured - Standard 64.5 in    Weight Measured - Standard 124 lb    BSA 1.6 m2    Body Mass Index 20.95 kg/m2      General:  Alert and oriented, No acute distress.    Eye:  Pupils are equal, round and reactive to light, Extraocular movements are intact, Normal conjunctiva.    HENT:  Normocephalic, Tympanic membranes are clear, Oral mucosa is  moist, No pharyngeal erythema.    Neck:  Supple, Non-tender, No lymphadenopathy, No thyromegaly.    Respiratory:  Lungs are clear to auscultation.    Cardiovascular:  Normal rate, Regular rhythm, HR 44.    Breast:  surgically absent bilaterally, no masses or lymphadenopathy.    Gastrointestinal:  Soft, Non-tender, Non-distended, No organomegaly.    Musculoskeletal:  Normal range of motion, Normal strength.    Integumentary:  Warm, Dry, Pink, No rash, no concerning lesions.    Neurologic:  Alert, Oriented, Normal sensory.    Psychiatric:  Cooperative, Appropriate mood & affect.       Review / Management   Results review      Impression and Plan   Diagnosis     Well adult exam (CGA66-NK Z00.00).     Postmenopausal (GBR77-FP Z78.0).     Screening for cardiovascular condition (VQB71-OT Z13.6).     Screening for diabetes mellitus (DYZ39-UJ Z13.1).     Course:  Progressing as expected.    Plan:  will check fasting labs. Referred for DEXA. HR reviewed - tends to run low related to running. No concerns. Follow up in one year, sooner as needed..    Patient Instructions:       Counseled: Patient, BMI, diet, and exercise.

## 2022-02-16 NOTE — PROCEDURES
Accession Number:       [id number]  CLINICAL INFORMATION::     Normal exam Postmenopausal  LMP::     NA  PREV. PAP::     4/2013  PREV. BX::     NONE GIVEN  SOURCE::     Cervix, Endocervix  STATEMENT OF ADEQUACY::     Satisfactory for evaluation. Endocervical/transformation zone component present.  INTERPRETATION/RESULT::     Negative for intraepithelial lesion or malignancy. Atrophic pattern; predominantly parabasal cells  COMMENT::     This Pap test has been evaluated with computer assisted technology.  CYTOTECHNOLOGIST::     DAVIS MARTINEZ(ASCP) CT Screening location: [place] [id number]

## 2022-02-16 NOTE — PROGRESS NOTES
Patient:   TONA LEONE            MRN: [id number]            FIN: [id number]               Age:   57 years     Sex:  Female     :  1960   Associated Diagnoses:   Bacterial pneumonia   Author:   Umang Sorto MD      Chief Complaint   3/25/2017 10:41 AM CDT   f/u pneumonia - not improving     Upper Respiratory Symptoms      History of Present Illness   from 3-             The patient presents with symptoms of an upper respiratory infection and chief complaint and symptoms as noted above confirmed with patient .  The symptoms of the upper respiratory infection are described as rhinorrhea, sore throat, nasal congestion, cough, yellow sputum and green sputum.  The severity of the symptoms associated to the upper respiratory infection is moderate.  The timing/course of upper respiratory infection symptoms fluctuates in intensity.  The symptoms of upper respiratory infection have lasted for 2 week(s).  Exacerbating factors consist of none.  Relieving factors consist of none.  Associated symptoms consist of none.       today she reports still coughing so is worried the medications not working,   she is not running fevers and no SOB or wheezing      Review of Systems   Constitutional:  Fatigue.    Eye:  Negative.    Ear/Nose/Mouth/Throat:  Nasal congestion.    Respiratory:  Cough, Sputum production, No shortness of breath, No wheezing.    Cardiovascular:  Negative.    Gastrointestinal:  Negative.    Genitourinary:  Negative.    Hematology/Lymphatics:  Negative.    Endocrine:  Negative.    Immunologic:  Negative.    Musculoskeletal:  Negative.    Integumentary:  Negative, No rash.    Neurologic:  Negative.    Psychiatric:  Negative.           All other systems reviewed and negative      Health Status   Allergies:    Allergic Reactions (Selected)  Severity Not Documented  Amoxicillin (Rash)  Penicillin (No reactions were documented)  Sulfa drug (No reactions were documented)   Problem list:    All  Problems  Breast Cancer / 1312947489 / Confirmed  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  BRCA1 Positive / V84.01 / Confirmed  Inactive: Axillary Lymphadenopathy / 785.6  benign right  Resolved: Cyst, Ovary, Dermoid / 220  left oophorectomy  Resolved: ASCUS (Atypical Squamous Cells of Undetermined Significance) on Pap Smear / 795.01  Resolved: Pregnancy / 367749693  Resolved: Pregnancy / 650992058  Resolved: Pregnancy / 363684378   Medications:  (Selected)   Prescriptions  Prescribed  Levaquin 500 mg oral tablet: 1 tab(s) ( 500 mg ), PO, q24hr, # 10 tab(s), 0 Refill(s), Type: Maintenance, Pharmacy: [facility], 1 tab(s) po q 24 hrs,x10 day(s)  Documented Medications  Documented  Daily Multiple Vitamins: 1 tab(s), po, daily, 0 Refill(s), Type: Maintenance  Fish Oil: po, daily, 0 Refill(s), Type: Maintenance  Vitamin D3: po, daily, 0 Refill(s), Type: Maintenance      Histories   Past Medical History:    Active  Breast Cancer (1567502354): Onset on 2013 at 53 years.  Comments:  2013 CDT 3:27 PM CDT - Olivia Cruz  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  BRCA1 Positive (V84.01)  Resolved  ASCUS (Atypical Squamous Cells of Undetermined Significance) on Pap Smear (795.01): Onset in the month of 2000 at 40 years  Resolved on 5/3/2010 at 50 years.  Cyst, Ovary, Dermoid (220):  Resolved on 5/3/2010 at 50 years.  Comments:  5/3/2010 CDT 9:14 AM CDT - Nicci Anderson  left oophorectomy  Pregnancy (118366007):  Resolved in  at 24 years.  Pregnancy (452098557):  Resolved in  at 26 years.  Pregnancy (627069163):  Resolved in  at 29 years.   Family History:    CA - Breast cancer  Grandmother (M) ()  Comments:  3/6/2013 2:43 PM - Adelaida Antonio  post menopausal  Diabetes mellitus  Brother  Breast cancer  Aunt (M)  Comments:  2013 4:09 PM - Olivia Cruz-menopausal.   (P)  Comments:  2013 4:09 PM - Camden DAVIS,  Olivia  Post-menopausal.  Prediabetes  Mother ()  Pancreatic cancer  Mother ()  Diabetes mellitus type 2  Father  Grandmother (M) ()  High blood pressure  Brother  Brother  Myocardial infarction  Father  Stroke  Grandmother (P) ()  Cancer  Grandfather (P) ()  CABG - Coronary artery bypass graft  Father: onset at 75 .  Comments:  3/6/2013 2:46 PM - Adelaida Antonio  stents also  MI - Myocardial infarction  Mother (): onset at 78 .  Comments:  3/6/2013 2:47 PM - Adelaida Antonio  mild  Coronary artery disease  Mother ()  CAD - Coronary artery disease  Father     Procedure history:    Right salpingo-oophorectomy (SNOMED CT 365831378) performed by Gus Snell MD on 2015 at 54 Years.  Breast reconstruction (SNOMED CT 18994345) in the month of 2014 at 54 Years.  Simple mastectomy of right breast (SNOMED CT 8755477909) performed by Gus Andrade on 2013 at 53 Years.  Insertion of tunneled centrally inserted central venous access device, with subcutaneous port; age 5 years or older (CPT-4 23967) performed by Gus Andrade on 2013 at 53 Years.  Lumpectomy of breast (SNOMED CT 9292604027) on 2013 at 53 Years.  Biopsy of breast (SNOMED CT 044103081) on 2013 at 53 Years.  Comments:  2013 3:24 PM - Olivia Cruz  Positive.  Colonoscopy (SNOMED CT 094226722) performed by Lalito Pineda MD on 2012 at 52 Years.  Comments:  2012 10:22 AM - Maye Hammonds MA  Normal repeat in 10 years  DEXA - Dual energy X-ray photon absorptiometry (SNOMED CT 250314042) on 2012 at 52 Years.  Comments:  2012 9:03 AM - Olivia Cruz  Normal. Just starting perimenopause.  HPV - Human papillomavirus test negative (SNOMED CT 9200114211) on 3/1/2009 at 49 Years.  Comments:  3/29/2011 3:21 PM - Olivia Cruz  Low risk for cervical cancer. OK to have pap q 3 yrs.  Laparotomy (SNOMED CT 094279021) on 1999 at 39  Years.  Left oophorectomy (SNOMED CT 955505050) on 5/17/1999 at 39 Years.  Comments:  3/29/2011 3:24 PM - Camden DAVIS Olivia  Dermoid cyst.  Colposcopy (SNOMED CT 9573194366).   Social History:        Alcohol Assessment: Current            3-5 times per week                     Comments:                      06/02/2016 - Adelaida Antonio                     1-2 per time.      Tobacco Assessment: Denies Tobacco Use      Substance Abuse Assessment: Denies Substance Abuse      Employment and Education Assessment            Employed, Work/School description: .      Exercise and Physical Activity Assessment            Exercise frequency: 6 times/week.  Exercise type: Running, Weight lifting.      Sexual Assessment            Sexually active: Yes.  Number of current partners 1.  Sexual orientation: Heterosexual..  Other contraceptive               use:  vasectomy..                     Comments:                      03/29/2011 - Camden DAVIS, Olivia                     .        Physical Examination   Vital Signs   3/25/2017 10:41 AM CDT Peripheral Pulse Rate 51 bpm  LOW    Pulse Site Radial artery    HR Method Electronic    Systolic Blood Pressure 118 mmHg    Diastolic Blood Pressure 72 mmHg    Mean Arterial Pressure 87 mmHg    BP Site Right arm    BP Method Manual    Oxygen Saturation 99 %      Measurements from flowsheet : Measurements   3/25/2017 10:41 AM CDT Height Measured - Standard 64.25 in    Weight Measured - Standard 120.6 lb    BSA 1.57 m2    Body Mass Index 20.54 kg/m2      General:  Alert and oriented, No acute distress.    Eye:  Normal conjunctiva.    HENT:  Normocephalic, Tympanic membranes are clear, Oral mucosa is moist, No pharyngeal erythema.    Neck:  Supple, Non-tender, No lymphadenopathy, No thyromegaly.    Respiratory:  Lungs are clear to auscultation.    Cardiovascular:  Normal rate, Regular rhythm, No murmur, Normal peripheral perfusion, No edema.    Integumentary:   Warm, Dry, No rash.       Review / Management   Results review:        cxr  xray is normal radiology to over read,  discussed with patient.       Impression and Plan   Diagnosis     Bacterial pneumonia (YHW26-PC J15.9).     Course:  Progressing as expected.    Plan:  finish levaquin  expect resolution over the next week.    Orders

## 2022-02-16 NOTE — PROGRESS NOTES
Patient:   TONA LEONE            MRN: [id number]            FIN: [id number]               Age:   61 years     Sex:  Female     :  1960   Associated Diagnoses:   Dysuria   Author:   Nas Gonzalez MD      Chief Complaint   2021 1:57 PM CDT    UTI CONCERNS - YESTERDAY AFTERNOON, BURNING AND DISCHARGE, BETTER TODAY        History of Present Illness   patient with burning discomfort and vaginal discharge that was dark yesterday, urgency and frequency  symptoms have improved today but concerned because was unusual  no fever      Health Status   Allergies:    Allergic Reactions (All)  Severity Not Documented  Amoxicillin (Rash)  Penicillin (No reactions were documented)  Sulfa drug (No reactions were documented)   Medications:  (Selected)   Documented Medications  Documented  Daily Multiple Vitamins: 1 tab(s), po, daily, 0 Refill(s), Type: Maintenance  Fish Oil: po, daily, 0 Refill(s), Type: Maintenance  Iron Chews: ( 15 mg ), po, daily, 0 Refill(s), Type: Maintenance  Vitamin D3: po, daily, 0 Refill(s), Type: Maintenance   Problem list:    All Problems (Selected)  BRCA1 positive / SNOMED CT 0224792692 / Confirmed  History of breast cancer / SNOMED CT 4274678944 / Confirmed  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  Seasonal allergies / SNOMED CT 948997187 / Confirmed      Histories   Past Medical History:    Active  History of breast cancer (SNOMED CT 8458670776): Onset on 2013 at 53 years.  Comments:  2013 CDT 3:27 PM CDT - Olivia Cruz  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  BRCA1 positive (SNOMED CT 6330163626)  Seasonal allergies (SNOMED CT 195788332)  Resolved  Atypical squamous cells of undetermined significance (ASCUS) on Papanicolaou smear of cervix (SNOMED CT 5886230273): Onset in the month of 2000 at 40 years  Resolved on 5/3/2010 at 50 years.  Cyst, ovary, dermoid (SNOMED CT 6329802224):  Resolved on 5/3/2010 at 50 years.  Comments:  5/3/2010 CDT  9:14 AM CDT - Justin Nicci  left oophorectomy  Pregnancy (SNOMED CT 891081163):  Resolved on 8/25/1985 at 25 years.  Pregnancy (SNOMED CT 016549332):  Resolved on 5/3/1987 at 27 years.  Pregnancy (SNOMED CT 739567791):  Resolved on 11/9/1990 at 30 years.  History of chicken pox (SNOMED CT 372305020):  Resolved.   Procedure history:    Extracapsular cataract extraction and insertion of intraocular lens (302770680) on 1/26/2021 at 60 Years.  Comments:  1/28/2021 9:30 AM Herlinda Ayoub  Left.  Extracapsular cataract extraction and insertion of intraocular lens (071515049) on 1/12/2021 at 60 Years.  Comments:  1/14/2021 10:55 AM Herlinda Aoyub  Right.  Laser eye surgery for retinal tear right eye (3934471037) in the month of 3/2020 at 60 Years.  Date of last PAP test (1027317045) on 5/15/2017 at 57 Years.  Right salpingo-oophorectomy (952802854) on 1/2/2015 at 54 Years.  Breast reconstruction (33406503) in the month of 4/2014 at 54 Years.  Bilateral mastectomy (62101796) in the month of 10/2013 at 53 Years.  Insertion of tunneled centrally inserted central venous access device, with subcutaneous port; age 5 years or older (13168) on 6/13/2013 at 53 Years.  Lumpectomy of breast (8344583630) on 5/24/2013 at 53 Years.  Biopsy of breast (283395897) on 5/2/2013 at 53 Years.  Comments:  7/1/2013 3:24 PM CDT - [place] NP-C, Olivia  Positive.  Colonoscopy (362537195) on 6/26/2012 at 52 Years.  Comments:  7/12/2012 10:22 AM CDT - Maye Hammonds MA  Normal repeat in 10 years  DEXA - Dual energy X-ray photon absorptiometry (851258807) on 4/23/2012 at 52 Years.  Comments:  4/26/2012 9:03 AM CDT - Cam[place] NP-C, Olivia  Normal. Just starting perimenopause.  HPV - Human papillomavirus test negative (8601718915) on 3/1/2009 at 49 Years.  Comments:  3/29/2011 3:21 PM LLUVIA - Olivia Cruz  Low risk for cervical cancer. OK to have pap q 3 yrs.  Laparotomy (530287275) on 5/17/1999 at 39 Years.  Left oophorectomy  (367982316) on 5/17/1999 at 39 Years.  Comments:  3/29/2011 3:24 PM CDT - Camden DAVIS, Olivia  Dermoid cyst.  Colposcopy (6854036948).      Physical Examination   Vital Signs   7/14/2021 1:57 PM CDT Peripheral Pulse Rate 50 bpm  LOW    Pulse Site Radial artery    HR Method Manual    Systolic Blood Pressure 102 mmHg    Diastolic Blood Pressure 62 mmHg    Mean Arterial Pressure 75 mmHg    BP Site Right arm    BP Method Manual      Measurements from flowsheet : Measurements   7/14/2021 1:57 PM CDT    Weight Measured - Standard                127 lb     General:  Alert and oriented, No acute distress.    Genitourinary:  No urethral discharge.         Vulva: Within normal limits.         Vagina: No discharge, No foreign body, No laceration, No lesions.         Cervix: No lesions.         Uterus: Not tender.       Review / Management   wet prep and UA normal, reviewed with patient      Impression and Plan   Diagnosis     Vaginal discharge (BOC33-UI N89.8).     Dysuria (VFS60-CW R30.0).     Plan:  will check urine culture, reassured patient that symptoms are likely normal but if recurs follow up.    Orders     Orders (Selected)   Outpatient Orders  Ordered (In Transit)  Culture, Urine, Routine* ([facility]): Specimen Type: Urine (Clean Catch), Collection Date: 07/14/21 13:52:00 CDT.

## 2022-02-16 NOTE — PROGRESS NOTES
Patient:   TONA LEONE            MRN: [id number]            FIN: [id number]               Age:   58 years     Sex:  Female     :  1960   Associated Diagnoses:   Abnormal AST and ALT; BRCA1 positive; History of breast cancer; Well woman exam   Author:   Jennifer Weston      Visit Information      Date of Service: 2018 09:00 am  Performing Location: [facility]  Encounter#: [id number]      Primary Care Provider (PCP):  Aníbal Bauer MD# [id number]      Chief Complaint   2018 9:02 AM CDT    Annual exam.        Well Adult History   PPC for her annual wellness exam  Pap done 2017: NILM  right salpingo-oophrectomy; left oophrectomy  hx of breast cancer with simple mastectomy: bilateral reconstruction; no mammogram needed; has implants removed in fall d/t repetitive infection  colonscopy due   dexa done last year: normal  see dentist at least annually and see eye doctor annually  , supportive relationship, no concerns with libido  three adult sons  normal lipids 2017  AST/ALT elevation in 2017  PHQ9=0         Review of Systems   Constitutional:  Negative.    Eye:  Negative.    Ear/Nose/Mouth/Throat:  Negative.    Respiratory:  Negative.    Cardiovascular:  Negative.    Breast:  Negative.    Gastrointestinal:  Negative.    Genitourinary:  Negative except as documented in history of present illness.    Gynecologic:  Negative.    Hematology/Lymphatics:  Negative.    Endocrine:  Negative.    Immunologic:  Negative.    Musculoskeletal:  Negative.    Integumentary:  Negative.    Neurologic:  Negative.    Psychiatric:  Negative.       Health Status   Allergies:    Allergic Reactions (Selected)  Severity Not Documented  Amoxicillin (Rash)  Penicillin (No reactions were documented)  Sulfa drug (No reactions were documented)   Medications:  (Selected)   Documented Medications  Documented  Daily Multiple Vitamins: 1 tab(s), po, daily, 0 Refill(s), Type: Maintenance  Fish  Oil: po, daily, 0 Refill(s), Type: Maintenance  Iron Chews: ( 15 mg ), po, daily, 0 Refill(s), Type: Maintenance  Vitamin D3: po, daily, 0 Refill(s), Type: Maintenance   Problem list:    All Problems (Selected)  BRCA1 positive / SNOMED CT 7605175675 / Confirmed  History of breast cancer / SNOMED CT 9568021747 / Confirmed  Seasonal allergies / SNOMED CT 050275492 / Confirmed      Histories   Family History:    CA - Breast cancer  Grandmother (M) ()  Comments:  3/6/2013 2:43 PM - Adelaida Antonio  post menopausal  Diabetes mellitus  Brother  Breast cancer  Aunt (M)  Comments:  2013 4:09 PM - Camden GRAY-C, Olivia  Post-menopausal.  Aunt (P)  Comments:  2013 4:09 PM - Cam[place] NP-C, Olivia  Post-menopausal.  Prediabetes  Mother ()  Pancreatic cancer  Mother ()  Diabetes mellitus type 2  Father  Grandmother (M) ()  High blood pressure  Brother  Brother  Myocardial infarction  Father  Stroke  Grandmother (P) ()  Cancer  Grandfather (P) ()  CABG - Coronary artery bypass graft  Father: onset at 75 .  Comments:  3/6/2013 2:46 PM - Adelaida Antonio  stents also  MI - Myocardial infarction  Mother (): onset at 78 .  Comments:  3/6/2013 2:47 PM - Adelaida Antonio  mild  Coronary artery disease  Mother ()  CAD - Coronary artery disease  Father     Procedure history:    Right salpingo-oophorectomy (182812916) on 2015 at 54 Years.  Breast reconstruction (67890701) in the month of 2014 at 54 Years.  Simple mastectomy of right breast (5654952605) on 2013 at 53 Years.  Insertion of tunneled centrally inserted central venous access device, with subcutaneous port; age 5 years or older (00375) on 2013 at 53 Years.  Lumpectomy of breast (9880299204) on 2013 at 53 Years.  Biopsy of breast (855131932) on 2013 at 53 Years.  Comments:  2013 3:24 PM - [place] NP-C, Olivia  Positive.  Colonoscopy (484494625) on 2012 at 52  Years.  Comments:  7/12/2012 10:22 AM - Cassius MCKENZIEMaye  Normal repeat in 10 years  DEXA - Dual energy X-ray photon absorptiometry (980860627) on 4/23/2012 at 52 Years.  Comments:  4/26/2012 9:03 AM - Olivia Cruz. Just starting perimenopause.  HPV - Human papillomavirus test negative (1240835040) on 3/1/2009 at 49 Years.  Comments:  3/29/2011 3:21 PM - Olivia Cruz  Low risk for cervical cancer. OK to have pap q 3 yrs.  Laparotomy (956633840) on 5/17/1999 at 39 Years.  Left oophorectomy (085145123) on 5/17/1999 at 39 Years.  Comments:  3/29/2011 3:24 PM - Olivia Cruz  Dermoid cyst.  Colposcopy (1174021751).   Social History:        Alcohol Assessment            Current, Daily, 1 drinks/episode average.  2 drinks/episode maximum.      Tobacco Assessment            Never smoker      Substance Abuse Assessment            Never      Employment and Education Assessment            Employed, Work/School description: .      Home and Environment Assessment            Marital status: .  Spouse/Partner name: Dilshad.  Risks in environment: owns secured gun.      Nutrition and Health Assessment            Type of diet: Regular.      Exercise and Physical Activity Assessment            Exercise frequency: 6 times/week.  Exercise type: Running, Weight lifting.      Sexual Assessment            Sexually active: Yes.  Sexual orientation: Heterosexual.  Contraceptive Use Details:  had vasectomy.        Physical Examination   Vital Signs   6/29/2018 9:02 AM CDT Temperature Tympanic 97.3 DegF  LOW    Peripheral Pulse Rate 52 bpm  LOW    Pulse Site Radial artery    HR Method Manual    Systolic Blood Pressure 126 mmHg    Diastolic Blood Pressure 66 mmHg    Mean Arterial Pressure 86 mmHg    BP Site Right arm    BP Method Manual      General:  Alert and oriented, No acute distress.    Eye:  Pupils are equal, round and reactive to light, Intact accommodation, Normal  conjunctiva, Vision unchanged.         Periorbital area: Within normal limits.    HENT:  Normocephalic, Tympanic membranes are clear, Normal hearing, Oral mucosa is moist, No pharyngeal erythema, No sinus tenderness.    Neck:  Supple, Non-tender, No lymphadenopathy, No thyromegaly.    Respiratory:  Lungs are clear to auscultation, Respirations are non-labored, No chest wall tenderness.    Cardiovascular:  Normal rate, Regular rhythm, No murmur, No edema.    Breast:  bilateral mastectomy scars, normal.    Gastrointestinal:  Soft, Non-tender, Non-distended, Normal bowel sounds, No organomegaly.    Genitourinary:  No costovertebral angle tenderness.    Lymphatics:  No lymphadenopathy neck, axilla, groin.    Musculoskeletal:  Normal range of motion, Normal strength, No swelling.    Integumentary:  Warm, Dry, Pink.    Neurologic:  Alert, Oriented, Normal sensory.    Psychiatric:  Cooperative, Appropriate mood & affect, Normal judgment.       Impression and Plan   Diagnosis     Abnormal AST and ALT (RWO50-FR R74.8).     BRCA1 positive (QHP84-DM Z15.01).     History of breast cancer (AZC68-EJ Z85.3).     Well woman exam (ZNF92-KC Z01.419).     Patient Instructions:       Counseled: Patient, Regarding diagnosis, Verbalized understanding.    Summary:  no pap needed, no need to monitor with US as no uterus or ovaries  no mammogram needed, states  has no difficulty with intimacy with her since having implants removed  colonoscopy due 2022  dexa due 2027 unless fracture and then will do this earlier  repeating ALT and AST to monitor for increase/changes  no medications to refill  continue to exercise on daily basis, weight normal/stable  calcium 1400-1600mg with 600-800 IU vit D daily  .

## 2022-02-16 NOTE — LETTER
(Inserted Image. Unable to display)   October 12, 2021    TONA LEONE   [address] [place] [address]            Dear TONA,      Thank you for selecting [facility] for your healthcare needs.    Our records indicate you are due for the following services:    Annual Physical.    Fasting Lab Tests ~ Please do not eat or drink anything 10 hours prior to your scheduled appointment time.  (Water and any medications that you may need are allowed unless directed otherwise.)    If you had your labs done at another facility or with Direct Access Lab Testing at [facility], please bring in a copy of the results to your next visit, mail a copy, or drop off a copy of your results to your Healthcare Provider.    (FYI   Regarding office visits: In some instances, a video visit or telephone visit may be offered as an option.)    You are due for lab work and an office visit; please schedule the lab appointment 1 week before the office visit.  This will assure all results are available to discuss with your Healthcare Provider during your visit.    **It is very helpful if you bring your medication bottles to your appointment.  This assures we have all of your current medications, including strength and dosing information, documented accurately in your medical record.    To schedule an appointment or if you have further questions, please contact your clinic at [phone].      Powered by [employer]    Sincerely,    Nas Gonzalez MD

## 2022-02-16 NOTE — PROGRESS NOTES
Patient:   TONA LEONE            MRN: [id number]            FIN: [id number]               Age:   60 years     Sex:  Female     :  1960   Associated Diagnoses:   Bilateral cataracts; Preop examination   Author:   Nas Gonzalez MD      Chief Complaint   2021 8:09 AM CST     Preop.  Cataract Surgery, Right 21 Left 21.  Dr. Rivera.  [facility].     Patient here for preop evaluation for cataract surgery, noted in chief complaint  has noted ongoing changes over the past 6-9 months with worsening vision      Review of Systems   Constitutional:  Negative.    Eye:  negative except per HPI.    Ear/Nose/Mouth/Throat:  Negative.    Respiratory:  Negative.    Cardiovascular:  Negative.    Gastrointestinal:  Negative.    Genitourinary:  Negative.    Hematology/Lymphatics:  Negative.    Endocrine:  Negative.    Immunologic:  Negative.    Musculoskeletal:  Negative.    Integumentary:  Negative.    Neurologic:  Negative.    Psychiatric:  Negative.       Health Status   Allergies:    Allergic Reactions (All)  Severity Not Documented  Amoxicillin (Rash)  Penicillin (No reactions were documented)  Sulfa drug (No reactions were documented)   Medications:  (Selected)   Documented Medications  Documented  Daily Multiple Vitamins: 1 tab(s), po, daily, 0 Refill(s), Type: Maintenance  Fish Oil: po, daily, 0 Refill(s), Type: Maintenance  Iron Chews: ( 15 mg ), po, daily, 0 Refill(s), Type: Maintenance  Vitamin D3: po, daily, 0 Refill(s), Type: Maintenance   Problem list:    All Problems (Selected)  Seasonal allergies / SNOMED CT 035872789 / Confirmed  History of breast cancer / SNOMED CT 5991991008 / Confirmed  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  BRCA1 positive / SNOMED CT 3783040479 / Confirmed      Histories   Past Medical History:    Active  History of breast cancer (SNOMED CT 4236566903): Onset on 2013 at 53 years.  Comments:  2013 CDT 3:27 PM DARNELLT - Olivia Cruz  Triple negative  (hormone receptor), less than 1 cm.  BRCA1 positive (SNOMED CT 9193770866)  Seasonal allergies (SNOMED CT 217805463)  Resolved  Atypical squamous cells of undetermined significance (ASCUS) on Papanicolaou smear of cervix (SNOMED CT 9762702619): Onset in the month of 2000 at 40 years  Resolved on 5/3/2010 at 50 years.  Cyst, ovary, dermoid (SNOMED CT 2943891202):  Resolved on 5/3/2010 at 50 years.  Comments:  5/3/2010 CDT 9:14 AM CDT - Nicci Anderson  left oophorectomy  Pregnancy (SNOMED CT 956975849):  Resolved on 1985 at 25 years.  Pregnancy (SNOMED CT 198297296):  Resolved on 5/3/1987 at 27 years.  Pregnancy (SNOMED CT 861011421):  Resolved on 1990 at 30 years.  History of chicken pox (SNOMED CT 065469189):  Resolved.   Family History:    CA - Breast cancer  Grandmother (M) ()  Comments:  3/6/2013 2:43 PM Adelaida Bernal  post menopausal  Diabetes mellitus  Brother  Breast cancer  Aunt (M)  Comments:  2013 4:09 PM CDT - Camden GRAY-C Olivia  Post-menopausal.  Aunt (P)  Comments:  2013 4:09 PM CDT - Cam[place] GRAY-C, Olivia  Post-menopausal.  Prediabetes  Mother ()  Pancreatic cancer  Mother ()  Diabetes mellitus type 2  Father  Grandmother (M) ()  High blood pressure  Brother  Brother  Father  Arthritis  Mother ()  Father  Myocardial infarction  Father  Stroke  Grandmother (P) ()  CABG - Coronary artery bypass graft  Father: onset at 75 .  Comments:  3/6/2013 2:46 PM Adelaida Bernal  stents also  MI - Myocardial infarction  Mother (): onset at 78 .  Comments:  3/6/2013 2:47 PM Adelaida Bernal  mild  Coronary artery disease  Mother ()  CAD - Coronary artery disease  Father  Cancer in situ of prostate  Grandfather (P) ()  High cholesterol  Father     Procedure history:    Laser eye surgery for retinal tear right eye (0408099815) in the month of 3/2020 at 60 Years.  Right salpingo-oophorectomy (547217261) on  1/2/2015 at 54 Years.  Breast reconstruction (40744049) in the month of 4/2014 at 54 Years.  Bilateral mastectomy (85637115) in the month of 10/2013 at 53 Years.  Insertion of tunneled centrally inserted central venous access device, with subcutaneous port; age 5 years or older (88705) on 6/13/2013 at 53 Years.  Lumpectomy of breast (8244870064) on 5/24/2013 at 53 Years.  Biopsy of breast (392619656) on 5/2/2013 at 53 Years.  Comments:  7/1/2013 3:24 PM CDT - Cam[place] NP-C, Olivia  Positive.  Colonoscopy (449158191) on 6/26/2012 at 52 Years.  Comments:  7/12/2012 10:22 AM CDT - Maye Hammonds MA  Normal repeat in 10 years  Laparotomy (083496951) on 5/17/1999 at 39 Years.  Left oophorectomy (448641015) on 5/17/1999 at 39 Years.  Comments:  3/29/2011 3:24 PM CDT - Cam[place] NP-C, Olivia  Dermoid cyst.      Physical Examination   Vital Signs   1/6/2021 8:09 AM CST Temperature Tympanic 97.9 DegF    Peripheral Pulse Rate 44 bpm  LOW    HR Method Electronic    Systolic Blood Pressure 124 mmHg    Diastolic Blood Pressure 72 mmHg    Mean Arterial Pressure 89 mmHg    BP Method Electronic      Measurements from flowsheet : Measurements   1/6/2021 8:09 AM CST Height Measured - Standard 64.5 in    Weight Measured - Standard 123.12 lb    BSA 1.59 m2    Body Mass Index 20.8 kg/m2      General:  Alert and oriented, No acute distress.    Eye:  Pupils are equal, round and reactive to light, Extraocular movements are intact, Normal conjunctiva.    HENT:  Normocephalic, Tympanic membranes are clear, Oral mucosa is moist.    Neck:  Supple, Non-tender, No lymphadenopathy, No thyromegaly.    Respiratory:  Lungs are clear to auscultation, Respirations are non-labored.    Cardiovascular:  Regular rhythm, No murmur, Good pulses equal in all extremities, bradycardic.    Gastrointestinal:  Soft, Non-tender.    Musculoskeletal:  Normal range of motion.    Integumentary:  Warm, Dry, Pink.    Neurologic:  Alert, Oriented.    Psychiatric:   Cooperative, Appropriate mood & affect.       Review / Management   Results review:  Lab results   10/12/2020 9:51 AM CDT Glucose Level 78 mg/dL    Cholesterol 196 mg/dL    Non-    HDL 84 mg/dL    Chol/HDL Ratio 2.3    LDL 98    Triglyceride 46 mg/dL   .       Impression and Plan   Diagnosis     Bilateral cataracts (TXL19-UF H26.9).     Preop examination (NBS36-LT Z01.818).     Condition:  Patient is stable and appropriate to proceed with surgery. No further testing indicated. Bradycardia is related to running and has been long term finding for patient. Follow up as needed.

## 2022-02-16 NOTE — NURSING NOTE
Comprehensive Intake Entered On:  1/6/2021 8:12 AM CST    Performed On:  1/6/2021 8:09 AM CST by Adelita Vickers               Summary   Chief Complaint :   Preop.  Cataract Surgery, Right 1/12/21 Left 1/26/21.  Dr. Rivera.  [facility].    Menstrual Status :   Postmenopausal   Weight Measured :   123.12 lb(Converted to: 123 lb 2 oz, 55.846 kg)    Height Measured :   64.5 in(Converted to: 5 ft 4 in, 163.83 cm)    Body Mass Index :   20.8 kg/m2   Body Surface Area :   1.59 m2   Systolic Blood Pressure :   124 mmHg   Diastolic Blood Pressure :   72 mmHg   Mean Arterial Pressure :   89 mmHg   Peripheral Pulse Rate :   44 bpm (LOW)    BP Method :   Electronic   HR Method :   Electronic   Temperature Tympanic :   97.9 DegF(Converted to: 36.6 DegC)    Race :      Languages :   English   Ethnicity :   Not  or    Adelita Vickers - 1/6/2021 8:09 AM CST   Health Status   Allergies Verified? :   Yes   Medication History Verified? :   Yes   Immunizations Current :   Yes   Pre-Visit Planning Status :   Completed   Tobacco Use? :   Never smoker   Adelita Vickers - 1/6/2021 8:09 AM CST   Meds / Allergies   (As Of: 1/6/2021 8:12:14 AM CST)   Allergies (Active)   amoxicillin  Estimated Onset Date:   Unspecified ; Reactions:   Rash ; Created By:   Nicci Anderson; Reaction Status:   Active ; Substance:   amoxicillin ; Updated By:   Nicci Anderson; Reviewed Date:   3/4/2019 10:59 AM CST      penicillin  Estimated Onset Date:   Unspecified ; Created By:   Maye Hammonds MA; Reaction Status:   Active ; Substance:   penicillin ; Updated By:   Maye Hammonds MA; Reviewed Date:   3/4/2019 10:59 AM CST      sulfa drug  Estimated Onset Date:   Unspecified ; Created By:   Maye Hammonds MA; Reaction Status:   Active ; Category:   Drug ; Substance:   sulfa drug ; Type:   Allergy ; Updated By:   Maye Hammonds MA; Reviewed Date:   3/4/2019 10:59 AM CST        Medication List   (As Of: 1/6/2021 8:12:14 AM CST)   Home Meds     carbonyl iron  :   carbonyl iron ; Status:   Documented ; Ordered As Mnemonic:   Iron Chews ; Simple Display Line:   15 mg, po, daily, 0 Refill(s) ; Catalog Code:   carbonyl iron ; Order Dt/Tm:   9/20/2017 3:11:49 PM CDT          cholecalciferol  :   cholecalciferol ; Status:   Documented ; Ordered As Mnemonic:   Vitamin D3 ; Simple Display Line:   po, daily, 0 Refill(s) ; Catalog Code:   cholecalciferol ; Order Dt/Tm:   5/9/2016 12:20:32 PM CDT          multivitamin  :   multivitamin ; Status:   Documented ; Ordered As Mnemonic:   Daily Multiple Vitamins ; Simple Display Line:   1 tab(s), po, daily, 0 Refill(s) ; Catalog Code:   multivitamin ; Order Dt/Tm:   5/9/2016 12:20:13 PM CDT          omega-3 polyunsaturated fatty acids  :   omega-3 polyunsaturated fatty acids ; Status:   Documented ; Ordered As Mnemonic:   Fish Oil ; Simple Display Line:   po, daily, 0 Refill(s) ; Catalog Code:   omega-3 polyunsaturated fatty acids ; Order Dt/Tm:   5/9/2016 12:20:18 PM CDT            ID Risk Screen   Recent Travel History :   No recent travel   Family Member Travel History :   No recent travel   Other Exposure to Infectious Disease :   Unknown   Adelita Vickers - 1/6/2021 8:09 AM CST   Social History   Social History   (As Of: 1/6/2021 8:12:14 AM CST)   Alcohol:        Current, Beer (12 oz), Wine (5 oz), 3-5 times per week, 1 drinks/episode average.  2 drinks/episode maximum.  Ready to change: No.   (Last Updated: 8/26/2019 10:36:37 AM CDT by Leslie Amanda)          Tobacco:        Never (less than 100 in lifetime)   (Last Updated: 1/6/2021 8:11:02 AM CST by Adelita Vickers)          Electronic Cigarette/Vaping:        Electronic Cigarette Use: Never.   (Last Updated: 1/6/2021 8:11:06 AM CST by Adelita Vickers)          Substance Abuse:        Never   (Last Updated: 5/24/2017 10:53:22 AM CDT by Norma Albert)          Employment/School:        Employed, Work/School description: [employer].  Highest education level:  University degree(s).   (Last Updated: 8/26/2019 10:36:54 AM CDT by Leslie Amanda)          Home/Environment:        Marital status: .  Spouse/Partner name: Marleni Wolff children.  Living situation: Home/Independent.  Injuries/Abuse/Neglect in household: No.  Feels unsafe at home: No.  Family/Friends available for support: Yes.  Risks in environment: Stairs, owns secured gun.   (Last Updated: 8/26/2019 10:37:19 AM CDT by Leslie Amanda)          Nutrition/Health:        Type of diet: Regular.  Wants to lose weight: No.  Sleeping concerns: No.  Feels highly stressed: No.   (Last Updated: 8/26/2019 10:37:35 AM CDT by Leslie Amanda)          Exercise:        Exercise frequency: 5-6 times/week.  Exercise type: Running, Weight lifting.   (Last Updated: 8/26/2019 10:37:45 AM CDT by Leslie Amanda)          Sexual:        Sexually active: Yes.  Identifies as female, Sexual orientation: Straight or heterosexual.  History of STD: No.  History of sexual abuse: No.   (Last Updated: 8/26/2019 10:38:07 AM CDT by Leslie Amanda)

## 2022-02-16 NOTE — TELEPHONE ENCOUNTER
---------------------  From: Nas Gonzalez MD   To: TONA LEONE    Sent: 7/16/2021 6:41:55 AM CDT  Subject: urine culture     Tona,  Your urine culture showed no bacteria - normal results. No signs of an infection. Please let me know if symptoms recur.  Anny Gonzalez     Results:  Date Result Name Value   7/14/2021 1:58 PM Urine Culture See comment

## 2022-02-16 NOTE — NURSING NOTE
Comprehensive Intake Entered On:  3/4/2019 10:57 AM CST    Performed On:  3/4/2019 10:53 AM CST by Kofi Huertas CMA               Summary   Chief Complaint :   Pt here for Right foot pain and edema on and off x 2 months.   Menstrual Status :   Postmenopausal   Weight Measured :   127 lb(Converted to: 127 lb 0 oz, 57.61 kg)    Height Measured :   64.5 in(Converted to: 5 ft 4 in, 163.83 cm)    Body Mass Index :   21.46 kg/m2   Body Surface Area :   1.62 m2   Systolic Blood Pressure :   108 mmHg   Diastolic Blood Pressure :   64 mmHg   Mean Arterial Pressure :   79 mmHg   Peripheral Pulse Rate :   44 bpm (LOW)    BP Site :   Right arm   Pulse Site :   Radial artery   Temperature Tympanic :   96.5 DegF(Converted to: 35.8 DegC)  (LOW)    Respiratory Rate :   16 br/min   Race :      Languages :   English   Ethnicity :   Not  or    Koif Huertas CMA - 3/4/2019 10:53 AM CST   Health Status   Allergies Verified? :   Yes   Medication History Verified? :   Yes   Immunizations Current :   Yes   Medical History Verified? :   Yes   Pre-Visit Planning Status :   Not completed   Tobacco Use? :   Never smoker   Kofi Huertas CMA - 3/4/2019 10:53 AM CST   Meds / Allergies   (As Of: 3/4/2019 10:57:17 AM CST)   Allergies (Active)   amoxicillin  Estimated Onset Date:   Unspecified ; Reactions:   Rash ; Created By:   Nicci Anderson; Reaction Status:   Active ; Substance:   amoxicillin ; Updated By:   Nicci Anderson; Reviewed Date:   3/4/2019 10:55 AM CST      penicillin  Estimated Onset Date:   Unspecified ; Created By:   Maye Hammonds; Reaction Status:   Active ; Substance:   penicillin ; Updated By:   Maye Hammonds; Reviewed Date:   3/4/2019 10:55 AM CST      sulfa drug  Estimated Onset Date:   Unspecified ; Created By:   Maye Hammonds; Reaction Status:   Active ; Category:   Drug ; Substance:   sulfa drug ; Type:   Allergy ; Updated By:   Maye Hammonds; Reviewed Date:   3/4/2019 10:55 AM CST         Medication List   (As Of: 3/4/2019 10:57:17 AM CST)   Home Meds    carbonyl iron  :   carbonyl iron ; Status:   Documented ; Ordered As Mnemonic:   Iron Chews ; Simple Display Line:   15 mg, po, daily, 0 Refill(s) ; Catalog Code:   carbonyl iron ; Order Dt/Tm:   9/20/2017 3:11:49 PM          multivitamin  :   multivitamin ; Status:   Documented ; Ordered As Mnemonic:   Daily Multiple Vitamins ; Simple Display Line:   1 tab(s), po, daily, 0 Refill(s) ; Catalog Code:   multivitamin ; Order Dt/Tm:   5/9/2016 12:20:13 PM          cholecalciferol  :   cholecalciferol ; Status:   Documented ; Ordered As Mnemonic:   Vitamin D3 ; Simple Display Line:   po, daily, 0 Refill(s) ; Catalog Code:   cholecalciferol ; Order Dt/Tm:   5/9/2016 12:20:32 PM          omega-3 polyunsaturated fatty acids  :   omega-3 polyunsaturated fatty acids ; Status:   Documented ; Ordered As Mnemonic:   Fish Oil ; Simple Display Line:   po, daily, 0 Refill(s) ; Catalog Code:   omega-3 polyunsaturated fatty acids ; Order Dt/Tm:   5/9/2016 12:20:18 PM            Social History   Social History   (As Of: 3/4/2019 10:57:17 AM CST)   Alcohol:        Current, Daily, 1 drinks/episode average.  2 drinks/episode maximum.   (Last Updated: 5/24/2017 10:53:13 AM CDT by Norma Albert)          Tobacco:        Never smoker   (Last Updated: 5/24/2017 10:53:18 AM CDT by Norma Albert)          Substance Abuse:        Never   (Last Updated: 5/24/2017 10:53:22 AM CDT by Norma Albert)          Employment and Education:        Employed, Work/School description: .   (Last Updated: 6/2/2016 8:52:14 AM CDT by Adelaida Antonio)          Home and Environment:        Marital status: .  Spouse/Partner name: Dilshad.  Risks in environment: Stairs, owns secured gun.   (Last Updated: 7/2/2018 10:20:09 AM CDT by Norma Albert)          Nutrition and Health:        Type of diet: Regular.   (Last Updated: 5/24/2017 10:53:47 AM CDT by Norma Albert)          Exercise and  Physical Activity:        Exercise frequency: 6 times/week.  Exercise type: Running, Weight lifting.   (Last Updated: 3/29/2011 3:16:36 PM CDT by Vickie Vega CMA)          Sexual:        Sexually active: Yes.  Identifies as female, Sexual orientation: Straight or heterosexual.   (Last Updated: 7/2/2018 10:20:27 AM CDT by Norma Albert)

## 2022-02-16 NOTE — LETTER
(Inserted Image. Unable to display)                                                                                                [address] [address]                                                August 26, 2019Re: TONA LEONE1960 [facility] [address] [place] [address]Ah:  [facility]The following patient has been referred to your office/practice:  TONA LEONE Appointment:  Appointment is PendingPlease refer to the attached  clinical documentation for a summary of TONA's care.  Please do not hesitate to contact our office if any additional clinical questions arise.  All relevant records and transition of care documents should be mailed or faxed.Your assistance in providing continuity of care is appreciated. Sincerely, [facility] [place] [place] [address](P) [phone](F) [phone]

## 2022-02-16 NOTE — PROGRESS NOTES
Patient:   TONA LENOE            MRN: [id number]            FIN: [id number]               Age:   57 years     Sex:  Female     :  1960   Associated Diagnoses:   Tailbone injury   Author:   Umang Sorto MD      Chief Complaint   2017 6:16 PM CST    Fell down a couple steps 2 days ago and landed on tailbone.  Pain is better during day but very difficult to sleep.  Small area of bruising      History of Present Illness   see chief complaint as noted above and confirmed with the patient   57 year old female here after a fall 2 days ago.  Was at a friends house and fell down a couple of stairs landing on tailbone.  Pain is tolerable during the day but increases significantly at night.      Review of Systems   Constitutional:  No fever, No weakness.    Gastrointestinal:  No nausea, No vomiting.    Immunologic:  No recurrent fevers, No recurrent infections.    Musculoskeletal:  Negative except as documented in history of present illness.    Neurologic:  No numbness, No tingling.             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  Comments:  2013 CDT 3:27 PM CDT - Parvezl NP-C, Olivia  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  BRCA1 positive (9855374619)  Seasonal allergies (464192537)  Resolved  Atypical squamous cells of undetermined significance (ASCUS) on Papanicolaou smear of cervix (1449732092): Onset in the month of 2000 at 40 years  Resolved on 5/3/2010 at 50 years.  Cyst, ovary, dermoid (9382042662):  Resolved on 5/3/2010 at 50 years.  Comments:  5/3/2010 CDT 9:14 AM CDT - Justin Nicci  left oophorectomy  Pregnancy (758486002):  Resolved in  at 24 years.  Pregnancy (754921498):  Resolved in  at 26 years.  Pregnancy (388905594):  Resolved in  at 29 years.  History of chicken pox (030095495):  Resolved.   Family History:    CA - Breast cancer  Grandmother (M) ()  Comments:  3/6/2013 2:43 PM - Adelaida Antonio  post menopausal  Diabetes mellitus  Brother  Breast cancer  Aunt (M)  Comments:  2013 4:09 PM - [place] NP-C, Olivia  Post-menopausal.  Aunt (P)  Comments:  2013 4:09 PM - [place] NP-C, Olivia  Post-menopausal.  Prediabetes  Mother ()  Pancreatic cancer  Mother ()  Diabetes mellitus type 2  Father  Grandmother (M) ()  High blood pressure  Brother  Brother  Myocardial infarction  Father  Stroke  Grandmother (P) ()  Cancer  Grandfather (P) ()  CABG - Coronary artery bypass graft  Father: onset at 75 .  Comments:  3/6/2013 2:46 PM - Adelaida Antonio  stents also  MI - Myocardial infarction  Mother (): onset at 78 .  Comments:  3/6/2013 2:47 PM - Adelaida Antonio  mild  Coronary artery disease  Mother ()  CAD - Coronary artery disease  Father     Procedure history:    Right salpingo-oophorectomy (SNOMED CT  491640814) performed by Gus Snell MD on 1/2/2015 at 54 Years.  Breast reconstruction (SNOMED CT 1960) in the month of 4/2014 at 54 Years.  Simple mastectomy of right breast (SNOMED CT 7988826976) performed by Gus Andrade on 11/4/2013 at 53 Years.  Insertion of tunneled centrally inserted central venous access device, with subcutaneous port; age 5 years or older (CPT-4 89921) performed by Gus Andrade on 6/13/2013 at 53 Years.  Lumpectomy of breast (SNOMED CT 6245953438) on 5/24/2013 at 53 Years.  Biopsy of breast (SNOMED CT 153619454) on 5/2/2013 at 53 Years.  Comments:  7/1/2013 3:24 PM - Olivia Cruz  Positive.  Colonoscopy (SNOMED CT 421177012) performed by Lalito Pineda MD on 6/26/2012 at 52 Years.  Comments:  7/12/2012 10:22 AM - Maye Hammonds MA repeat in 10 years  DEXA - Dual energy X-ray photon absorptiometry (SNOMED CT 570685053) on 4/23/2012 at 52 Years.  Comments:  4/26/2012 9:03 AM - Olivia Cruz  Normal. Just starting perimenopause.  HPV - Human papillomavirus test negative (SNOMED CT 7433769728) on 3/1/2009 at 49 Years.  Comments:  3/29/2011 3:21 PM - Olivia Cruz  Low risk for cervical cancer. OK to have pap q 3 yrs.  Laparotomy (SNOMED CT 124328662) on 5/17/1999 at 39 Years.  Left oophorectomy (SNOMED CT 953238544) on 5/17/1999 at 39 Years.  Comments:  3/29/2011 3:24 PM - Olivia Cruz  Dermoid cyst.  Colposcopy (SNOMED CT 0210312499).   Social History:        Alcohol Assessment            Current, Daily, 1 drinks/episode average.  2 drinks/episode maximum.      Tobacco Assessment            Never smoker      Substance Abuse Assessment            Never      Employment and Education Assessment            Employed, Work/School description: .      Home and Environment Assessment            Marital status: .  Spouse/Partner name: Dilshad.  Risks in environment: owns secured gun.      Nutrition and Health Assessment             Type of diet: Regular.      Exercise and Physical Activity Assessment            Exercise frequency: 6 times/week.  Exercise type: Running, Weight lifting.      Sexual Assessment            Sexually active: Yes.  Sexual orientation: Heterosexual.  Contraceptive Use Details:  had vasectomy.        Physical Examination   Vital Signs   11/8/2017 6:16 PM CST Peripheral Pulse Rate 48 bpm  LOW    Systolic Blood Pressure 110 mmHg    Diastolic Blood Pressure 60 mmHg    Mean Arterial Pressure 77 mmHg      Measurements from flowsheet : Measurements   11/8/2017 6:16 PM CST Height Measured - Standard 64.25 in    Weight Measured - Standard 124.6 lb    BSA 1.6 m2    Body Mass Index 21.22 kg/m2      General:  Alert and oriented, No acute distress.    Eye:  Pupils are equal, round and reactive to light, Normal conjunctiva.    HENT:  Oral mucosa is moist.    Neck:  Supple.    Respiratory:  Respirations are non-labored.    Cardiovascular:  Normal rate, Regular rhythm, No edema.    Gastrointestinal:  Non-distended.    Musculoskeletal:  Normal gait.    Integumentary:  Warm, No rash.    Psychiatric:  Cooperative, Appropriate mood & affect, Normal judgment.       Review / Management   Results review   Radiology results   X ray was reviewed and discussed with patient, radiologist read pending.       Impression and Plan   Diagnosis     Tailbone injury (MCQ74-FD S39.92XA).     Plan:  Xray did not indicate obvious fracture.  may be distal coccyx non displaced crack  Discussed using a donut pillow for comfort.  Advised gentle stretching and ibuprofen for pain.  Did offer something stronger for pain but patient declined.  Discussed what to watch for and when to return..    Orders     Orders (Selected)   Outpatient Orders  Completed  XR Sacrum/Coccyx Min 2 Views (Request): Tailbone injury.

## 2022-02-16 NOTE — LETTER
(Inserted Image. Unable to display)   [address] [place] [address]  October 13, 2020      TONA LEONE       [address] [place], [place] [id number]      Dear TONA,    Thank you for selecting [facility] for your healthcare needs. Below you will find the results of the recent tests done at our clinic.     Your lab results are excellent. Cholesterol is better than last year. Let me know if you have questions.    Result Name Current Result Previous Result Reference Range   Glucose Level (mg/dL)  78 10/12/2020  89 8/23/2019 65 - 99   Cholesterol (mg/dL)  196 10/12/2020 ((H)) 206 8/23/2019  - <200   Non-HDL Cholesterol  112 10/12/2020  123 8/23/2019  - <130   HDL (mg/dL)  84 10/12/2020  83 8/23/2019 > OR = 50 -    Cholesterol/HDL Ratio  2.3 10/12/2020  2.5 8/23/2019  - <5.0   LDL  98 10/12/2020 ((H)) 110 8/23/2019    Triglyceride (mg/dL)  46 10/12/2020  50 8/23/2019  - <150       Please contact me or my assistant at [phone] if you have any questions or concerns.     Sincerely,        Nas Gonzalez M.D.

## 2022-02-16 NOTE — LETTER
(Inserted Image. Unable to display)       August 27, 2019      TONA LEONE   [address] [place], [place] [id number]        Dear TONA,     Thank you for selecting [facility] for your healthcare needs. Below you will find the results of your recent test(s) done at our clinic.      Your diabetes screening looks great.  Your cholesterol is a little bit higher than it should be.  Please take a look at the American Heart Association's website for some information on dietary and lifestyle changes your can do to improve your cholesterol.   You are also welcome to meet with our dietitian, or come in to discuss your cardiovascular disease risk with me.        Result Name Current Result Reference Range   Sodium Level (mmol/L)  140 8/23/2019 135 - 146   Potassium Level (mmol/L)  4.7 8/23/2019 3.5 - 5.3   Chloride Level (mmol/L)  104 8/23/2019 98 - 110   CO2 Level (mmol/L)  29 8/23/2019 20 - 32   Glucose Level (mg/dL)  89 8/23/2019 65 - 99   BUN (mg/dL)  19 8/23/2019 7 - 25   Creatinine Level (mg/dL)  0.79 8/23/2019 0.50 - 1.05   BUN/Creat Ratio  NOT APPLICABLE 8/23/2019 6 - 22   eGFR (mL/min/1.73m2)  83 8/23/2019 > OR = 60 -    eGFR  (mL/min/1.73m2)  96 8/23/2019 > OR = 60 -    Calcium Level (mg/dL)  10.0 8/23/2019 8.6 - 10.4   Hgb A1c  5.4 8/23/2019  - <5.7   Cholesterol (mg/dL) ((H)) 206 8/23/2019  - <200   Non-HDL Cholesterol  123 8/23/2019  - <130   HDL (mg/dL)  83 8/23/2019 >50 -    Cholesterol/HDL Ratio  2.5 8/23/2019  - <5.0   LDL ((H)) 110 8/23/2019    Triglyceride (mg/dL)  50 8/23/2019  - <150     Please contact my practice at [phone] if you have any questions or concerns.     Sincerely,        Radha Crawley MD      What do your labs mean?  Below is a glossary of commonly ordered labs:  LDL   Bad Cholesterol   HDL   Good Cholesterol  AST/ALT   Liver Function   Cr/Creatinine   Kidney Function  Microalbumin   Kidney Function  BUN   Kidney Function  PSA   Prostate    TSH    Thyroid Hormone  HgbA1c   Diabetes Test   Hgb (Hemoglobin)   Red Blood Cells

## 2022-02-16 NOTE — RESULTS
Patient:   TONA LEONE            MRN: [id number]            FIN: [id number]               Age:   60 years     Sex:  Female     :  1960   Associated Diagnoses:   None   Author:   Nas Gonzalez MD      ORDERING DIAGNOSIS:  Postmenopausal.    FINDINGS:  On 2020, an evaluation of your patient was performed using the GE Lunar Prodigy DEXA Scanner.  The results of the study, expressed as bone mineral density (BMD) are as follows:                                BMD (g/cm2)                          T-Score                                   Z-Score  AP Spine (L1-L4)             1.100 -0.7 0.4    Left Right Left Right Left Right   Femoral Neck   0.983 1.029 -0.4 -0.1 0.8 1.1   Total Hip   0.960 1.018 -0.4 0.1 0.5 0.9                      PREVIOUS SCAN DATE:  2017    DEFINITION OF TERMS AND VALUES  TERMS:   BMD:  Bone mineral density (gm/cm2).   T#:  Standard deviation of the patient's bone mineral density from that of a young adult patient.   Z#:  Standard deviation of a patient's BMD from that of an age matched patient.    VALUES ACCORDING TO THE WORLD HEALTH ORGANIZATION:   Normal range:  +/- 1 standard deviation      Osteoporosis:  BMD of 2.5 standard deviations below that of a young adult (i.e. T# -2.5).     Osteopenia:  Values falling between -1 standard deviation and those values where osteoporosis threshold is reached.    SIGNIFICANCE OF THE VALUES:  For each standard deviation (T#) below the normal value of the young adult, there is an approximate doubling of the risk for fracture (i.e. T# -1 equals a two-fold increased risk, T# -2 equals a  four-fold increased risk, etc.) over that of a young adult.    CONCLUSION:    x Normal          _ Osteopenia          _ Osteoporosis          _ No significant change    COMMENTS:  _    Ordering HCP:  Nas Gonzalez MD  Interpreting HCP: Nas Gonzalez MD

## 2022-02-16 NOTE — LETTER
(Inserted Image. Unable to display)   [address] [place] [address]  [phone] (phone) [phone] (fax)  November 05, 2021      TONA LEONE       [address] [place] [address]      Dear TONA,    Thank you for selecting [facility] for your healthcare needs. Below you will find the results of the recent tests done at our clinic.     Normal pap smear with negative HPV.  The pap can be repeated every five years unless there is concern about increased risk. We still will want to see you once a year for an annual exam.    Result Name Current Result Reference Range   Statement of Adequacy:  Satisfactory for evaluation. Endocervical/transformation zone component present. 11/1/2021    Interpretation/Result:  Negative for intraepithelial lesion or malignancy. 11/1/2021    HPV High Risk  Not Detected 11/1/2021 Not Detected -        Please contact me or my assistant at [phone] if you have any questions or concerns.     Sincerely,        Nas Gonzalez M.D.

## 2022-02-16 NOTE — NURSING NOTE
Depression Screening Entered On:  10/12/2020 12:58 PM CDT    Performed On:  10/12/2020 12:58 PM CDT by Anel Vasquez CMA               Depression Screening   Little Interest - Pleasure in Activities :   Not at all   Feeling Down, Depressed, Hopeless :   Not at all   Initial Depression Screen Score :   0 Score   Poor Appetite or Overeating :   Not at all   Trouble Falling or Staying Asleep :   Not at all   Feeling Tired or Little Energy :   Not at all   Feeling Bad About Yourself :   Not at all   Trouble Concentrating :   Not at all   Moving or Speaking Slowly :   Not at all   Thoughts Better Off Dead or Hurting Self :   Not at all   JOHNNY Difficulty with Work, Home, Others :   Not difficult at all   Detailed Depression Screen Score :   0    Total Depression Screen Score :   0    Anel Vasquez CMA - 10/12/2020 12:58 PM CDT

## 2022-02-16 NOTE — NURSING NOTE
Depression Screening Entered On:  8/26/2019 10:46 AM CDT    Performed On:  8/22/2019 10:44 AM CDT by Leslie Amanda               Depression Screening   Little Interest - Pleasure in Activities :   Not at all   Feeling Down, Depressed, Hopeless :   Not at all   Initial Depression Screen Score :   0    Trouble Falling or Staying Asleep :   Not at all   Feeling Tired or Little Energy :   Not at all   Poor Appetite or Overeating :   Not at all   Feeling Bad About Yourself :   Not at all   Trouble Concentrating :   Not at all   Moving or Speaking Slowly :   Not at all   Thoughts Better Off Dead or Hurting Self :   Not at all   Detailed Depression Screen Score :   0    Total Depression Screen Score :   0    Leslie Amanda - 8/26/2019 10:44 AM CDT

## 2022-02-16 NOTE — NURSING NOTE
Comprehensive Intake Entered On:  8/22/2019 3:07 PM CDT    Performed On:  8/22/2019 3:04 PM CDT by Catherine Alexandre CMA   Chief Complaint :   Physical    Menstrual Status :   Postmenopausal   Weight Measured :   125.6 lb(Converted to: 125 lb 10 oz, 56.97 kg)    Height Measured :   64 in(Converted to: 5 ft 4 in, 162.56 cm)    Body Mass Index :   21.56 kg/m2   Body Surface Area :   1.6 m2   Systolic Blood Pressure :   112 mmHg   Diastolic Blood Pressure :   66 mmHg   Mean Arterial Pressure :   81 mmHg   Peripheral Pulse Rate :   52 bpm (LOW)    BP Site :   Right arm   BP Method :   Manual   HR Method :   Electronic   Temperature Tympanic :   98.2 DegF(Converted to: 36.8 DegC)    Oxygen Saturation :   98 %   Race :      Languages :   English   Ethnicity :   Not  or    Catherine Alexandre CMA - 8/22/2019 3:04 PM CDT   Health Status   Allergies Verified? :   Yes   Medication History Verified? :   Yes   Immunizations Current :   Yes   Pre-Visit Planning Status :   Completed   Tobacco Use? :   Never smoker   Catherine Alexandre CMA - 8/22/2019 3:04 PM CDT   Consents   Consent for Immunization Exchange :   Consent Granted   Consent for Immunizations to Providers :   Consent Granted   Catherine Alexandre CMA - 8/22/2019 3:04 PM CDT   Meds / Allergies   (As Of: 8/22/2019 3:07:32 PM CDT)   Allergies (Active)   amoxicillin  Estimated Onset Date:   Unspecified ; Reactions:   Rash ; Created By:   Nicci Anderson; Reaction Status:   Active ; Substance:   amoxicillin ; Updated By:   Nicci Anderson; Reviewed Date:   3/4/2019 10:59 AM CST      penicillin  Estimated Onset Date:   Unspecified ; Created By:   Maye Hammonds MA; Reaction Status:   Active ; Substance:   penicillin ; Updated By:   Maye Hammonds MA; Reviewed Date:   3/4/2019 10:59 AM CST      sulfa drug  Estimated Onset Date:   Unspecified ; Created By:   Maye Hammonds MA; Reaction Status:   Active ; Category:   Drug ; Substance:   sulfa drug ; Type:    Allergy ; Updated By:   Maye Hammonds MA; Reviewed Date:   3/4/2019 10:59 AM CST        Medication List   (As Of: 8/22/2019 3:07:32 PM CDT)   Home Meds    carbonyl iron  :   carbonyl iron ; Status:   Documented ; Ordered As Mnemonic:   Iron Chews ; Simple Display Line:   15 mg, po, daily, 0 Refill(s) ; Catalog Code:   carbonyl iron ; Order Dt/Tm:   9/20/2017 3:11:49 PM          cholecalciferol  :   cholecalciferol ; Status:   Documented ; Ordered As Mnemonic:   Vitamin D3 ; Simple Display Line:   po, daily, 0 Refill(s) ; Catalog Code:   cholecalciferol ; Order Dt/Tm:   5/9/2016 12:20:32 PM          multivitamin  :   multivitamin ; Status:   Documented ; Ordered As Mnemonic:   Daily Multiple Vitamins ; Simple Display Line:   1 tab(s), po, daily, 0 Refill(s) ; Catalog Code:   multivitamin ; Order Dt/Tm:   5/9/2016 12:20:13 PM          omega-3 polyunsaturated fatty acids  :   omega-3 polyunsaturated fatty acids ; Status:   Documented ; Ordered As Mnemonic:   Fish Oil ; Simple Display Line:   po, daily, 0 Refill(s) ; Catalog Code:   omega-3 polyunsaturated fatty acids ; Order Dt/Tm:   5/9/2016 12:20:18 PM

## 2022-02-16 NOTE — LETTER
(Inserted Image. Unable to display)   October 23, 2019        TONA LEONE   [address] [place], [place] [id number]        Dear TONA,      Thank you for selecting [facility] (previously [facility]) for your healthcare needs.    Our records indicate you are due for the following services:     Immunizations: Shingrix #2 Vaccine    To schedule an appointment or if you have further questions, please contact your primary clinic:   Pending sale to [facility]       [phone]   [facility]       [phone]              [facility]     [phone]      Powered by [employer]    Sincerely,    Aníbal Bauer M.D.

## 2022-02-16 NOTE — NURSING NOTE
Comprehensive Intake Entered On:  7/14/2021 2:15 PM CDT    Performed On:  7/14/2021 1:57 PM CDT by Matthew Jones LPN               Summary   Chief Complaint :   UTI CONCERNS - YESTERDAY AFTERNOON, BURNING AND DISCHARGE, BETTER TODAY   Menstrual Status :   Postmenopausal   Weight Measured :   127 lb(Converted to: 127 lb 0 oz, 57.606 kg)    Systolic Blood Pressure :   102 mmHg   Diastolic Blood Pressure :   62 mmHg   Mean Arterial Pressure :   75 mmHg   Peripheral Pulse Rate :   50 bpm (LOW)    BP Site :   Right arm   Pulse Site :   Radial artery   BP Method :   Manual   HR Method :   Manual   Race :      Languages :   English   Ethnicity :   Not  or    Matthew Jones LPN - 7/14/2021 1:57 PM CDT   Consents   Consent for Immunization Exchange :   Consent Granted   Consent for Immunizations to Providers :   Consent Granted   Matthew Jones LPN - 7/14/2021 1:57 PM CDT   Meds / Allergies   (As Of: 7/14/2021 2:15:56 PM CDT)   Allergies (Active)   amoxicillin  Estimated Onset Date:   Unspecified ; Reactions:   Rash ; Created By:   Nicci Anderson; Reaction Status:   Active ; Substance:   amoxicillin ; Updated By:   Nicci Anderson; Reviewed Date:   7/14/2021 1:57 PM CDT      penicillin  Estimated Onset Date:   Unspecified ; Created By:   Maye Hammonds MA; Reaction Status:   Active ; Substance:   penicillin ; Updated By:   Maye Hammonds MA; Reviewed Date:   7/14/2021 1:57 PM CDT      sulfa drug  Estimated Onset Date:   Unspecified ; Created By:   Maye Hammonds MA; Reaction Status:   Active ; Category:   Drug ; Substance:   sulfa drug ; Type:   Allergy ; Updated By:   Maye Hammonds MA; Reviewed Date:   7/14/2021 1:57 PM CDT        Medication List   (As Of: 7/14/2021 2:15:56 PM CDT)   Home Meds    carbonyl iron  :   carbonyl iron ; Status:   Documented ; Ordered As Mnemonic:   Iron Chews ; Simple Display Line:   15 mg, po, daily, 0 Refill(s) ; Catalog Code:   carbonyl iron ; Order Dt/Tm:   9/20/2017  3:11:49 PM CDT          cholecalciferol  :   cholecalciferol ; Status:   Documented ; Ordered As Mnemonic:   Vitamin D3 ; Simple Display Line:   po, daily, 0 Refill(s) ; Catalog Code:   cholecalciferol ; Order Dt/Tm:   5/9/2016 12:20:32 PM CDT          multivitamin  :   multivitamin ; Status:   Documented ; Ordered As Mnemonic:   Daily Multiple Vitamins ; Simple Display Line:   1 tab(s), po, daily, 0 Refill(s) ; Catalog Code:   multivitamin ; Order Dt/Tm:   5/9/2016 12:20:13 PM CDT          omega-3 polyunsaturated fatty acids  :   omega-3 polyunsaturated fatty acids ; Status:   Documented ; Ordered As Mnemonic:   Fish Oil ; Simple Display Line:   po, daily, 0 Refill(s) ; Catalog Code:   omega-3 polyunsaturated fatty acids ; Order Dt/Tm:   5/9/2016 12:20:18 PM CDT            Social History   Social History   (As Of: 7/14/2021 2:15:56 PM CDT)   Alcohol:        Current, Beer (12 oz), Wine (5 oz), 3-5 times per week, 1 drinks/episode average.  2 drinks/episode maximum.  Ready to change: No.   (Last Updated: 1/8/2021 2:57:10 PM CST by Leslie Amanda)          Tobacco:        Never (less than 100 in lifetime)   (Last Updated: 1/8/2021 2:57:14 PM CST by Leslie Amanda)          Electronic Cigarette/Vaping:        Electronic Cigarette Use: Never.   (Last Updated: 1/8/2021 2:57:17 PM CST by Leslie Amanda)          Substance Abuse:        Never   (Last Updated: 1/8/2021 2:57:20 PM CST by Leslie Amanda)          Employment/School:        Part time, Work/School description: office work.  Highest education level: University degree(s).   (Last Updated: 1/8/2021 2:57:35 PM CST by Leslie Amanda)          Home/Environment:        Marital status: .  Spouse/Partner name: Dilshad.  Mariella children.  Living situation: Home/Independent.  Injuries/Abuse/Neglect in household: No.  Feels unsafe at home: No.  Family/Friends available for support: Yes.  Risks in environment: Stairs, owns secured gun.   (Last Updated: 1/8/2021 2:57:48 PM CST by Treasure ,  Leslie)          Nutrition/Health:        Type of diet: Regular.  Wants to lose weight: No.  Sleeping concerns: No.  Feels highly stressed: No.   (Last Updated: 1/8/2021 2:57:54 PM CST by Leslie Amanda)          Exercise:        Exercise frequency: 5-6 times/week.  Exercise type: Running, Weight lifting.   (Last Updated: 1/8/2021 2:58:02 PM CST by Leslie Amanda)          Sexual:        Sexually active: Yes.  Identifies as female, Sexual orientation: Straight or heterosexual.  History of STD: No.  History of sexual abuse: No.   (Last Updated: 1/8/2021 2:59:41 PM CST by Leslie Amanda)

## 2022-02-16 NOTE — NURSING NOTE
CAGE Assessment Entered On:  8/26/2019 10:44 AM CDT    Performed On:  8/22/2019 10:44 AM CDT by Leslie Amanda               Assessment   Have you ever felt you should cut down on your drinking :   No   Have people annoyed you by criticizing your drinking :   No   Have you ever felt bad or guilty about your drinking :   No   Have you ever taken a drink first thing in the morning to steady your nerves or get rid of a hangover (Eye-opener) :   No   CAGE Score :   0    Leslie Amanda - 8/26/2019 10:44 AM CDT

## 2022-02-16 NOTE — NURSING NOTE
Comprehensive Intake Entered On:  11/1/2021 10:26 AM CDT    Performed On:  11/1/2021 10:19 AM CDT by Matthew Jones LPN               Summary   Chief Complaint :   Annual Physical - No other concerns   Menstrual Status :   Postmenopausal   Weight Measured :   127.5 lb(Converted to: 127 lb 8 oz, 57.833 kg)    Height Measured :   64.5 in(Converted to: 5 ft 4 in, 163.83 cm)    Body Mass Index :   21.54 kg/m2   Body Surface Area :   1.62 m2   Systolic Blood Pressure :   112 mmHg   Diastolic Blood Pressure :   66 mmHg   Mean Arterial Pressure :   81 mmHg   Peripheral Pulse Rate :   44 bpm (LOW)    BP Site :   Right arm   Pulse Site :   Radial artery   BP Method :   Manual   HR Method :   Manual   Race :      Languages :   English   Ethnicity :   Not  or    Matthew Jones LPN - 11/1/2021 10:19 AM CDT   Consents   Consent for Immunization Exchange :   Consent Granted   Consent for Immunizations to Providers :   Consent Granted   Matthew Jones LPN - 11/1/2021 10:19 AM CDT   Meds / Allergies   (As Of: 11/1/2021 10:26:48 AM CDT)   Allergies (Active)   amoxicillin  Estimated Onset Date:   Unspecified ; Reactions:   Rash ; Created By:   Nicci Anderson; Reaction Status:   Active ; Substance:   amoxicillin ; Updated By:   Nicci Anderson; Reviewed Date:   11/1/2021 10:20 AM CDT      penicillin  Estimated Onset Date:   Unspecified ; Created By:   Maye Hammonds MA; Reaction Status:   Active ; Substance:   penicillin ; Updated By:   Maye Hammonds MA; Reviewed Date:   11/1/2021 10:20 AM CDT      sulfa drug  Estimated Onset Date:   Unspecified ; Created By:   Maye Hammonds MA; Reaction Status:   Active ; Category:   Drug ; Substance:   sulfa drug ; Type:   Allergy ; Updated By:   Maye Hammonds MA; Reviewed Date:   11/1/2021 10:20 AM CDT        Medication List   (As Of: 11/1/2021 10:26:48 AM CDT)   Home Meds    carbonyl iron  :   carbonyl iron ; Status:   Documented ; Ordered As Mnemonic:   Iron Chews ;  Simple Display Line:   15 mg, po, daily, 0 Refill(s) ; Catalog Code:   carbonyl iron ; Order Dt/Tm:   9/20/2017 3:11:49 PM CDT          cholecalciferol  :   cholecalciferol ; Status:   Documented ; Ordered As Mnemonic:   Vitamin D3 ; Simple Display Line:   po, daily, 0 Refill(s) ; Catalog Code:   cholecalciferol ; Order Dt/Tm:   5/9/2016 12:20:32 PM CDT          multivitamin  :   multivitamin ; Status:   Documented ; Ordered As Mnemonic:   Daily Multiple Vitamins ; Simple Display Line:   1 tab(s), po, daily, 0 Refill(s) ; Catalog Code:   multivitamin ; Order Dt/Tm:   5/9/2016 12:20:13 PM CDT          omega-3 polyunsaturated fatty acids  :   omega-3 polyunsaturated fatty acids ; Status:   Documented ; Ordered As Mnemonic:   Fish Oil ; Simple Display Line:   po, daily, 0 Refill(s) ; Catalog Code:   omega-3 polyunsaturated fatty acids ; Order Dt/Tm:   5/9/2016 12:20:18 PM CDT            Depression Screening   Little Interest - Pleasure in Activities :   Not at all   Feeling Down, Depressed, Hopeless :   Not at all   Initial Depression Screen Score :   0 Score   Poor Appetite or Overeating :   Not at all   Trouble Falling or Staying Asleep :   Not at all   Feeling Tired or Little Energy :   Not at all   Feeling Bad About Yourself :   Not at all   Trouble Concentrating :   Not at all   Moving or Speaking Slowly :   Not at all   Thoughts Better Off Dead or Hurting Self :   Not at all   Detailed Depression Screen Score :   0    Total Depression Screen Score :   0    Matthew Jones LPN - 11/1/2021 10:19 AM CDT   Social History   Social History   (As Of: 11/1/2021 10:26:48 AM CDT)   Alcohol:        Current, Beer (12 oz), Wine (5 oz), 3-5 times per week, 1 drinks/episode average.  2 drinks/episode maximum.  Ready to change: No.   (Last Updated: 1/8/2021 2:57:10 PM CST by Leslie Amanda)          Tobacco:        Never (less than 100 in lifetime)   (Last Updated: 1/8/2021 2:57:14 PM CST by Leslie Amanda)          Electronic  Cigarette/Vaping:        Electronic Cigarette Use: Never.   (Last Updated: 1/8/2021 2:57:17 PM CST by Leslie Amanda)          Substance Abuse:        Never   (Last Updated: 1/8/2021 2:57:20 PM CST by Leslie Amanda)          Employment/School:        Part time, Work/School description: office work.  Highest education level: University degree(s).   (Last Updated: 1/8/2021 2:57:35 PM CST by Leslie Amanda)          Home/Environment:        Marital status: .  Spouse/Partner name: Marleni Wolff children.  Living situation: Home/Independent.  Injuries/Abuse/Neglect in household: No.  Feels unsafe at home: No.  Family/Friends available for support: Yes.  Risks in environment: Stairs, owns secured gun.   (Last Updated: 1/8/2021 2:57:48 PM CST by Leslie Amanda)          Nutrition/Health:        Type of diet: Regular.  Wants to lose weight: No.  Sleeping concerns: No.  Feels highly stressed: No.   (Last Updated: 1/8/2021 2:57:54 PM CST by Leslie Amanda)          Exercise:        Exercise frequency: 5-6 times/week.  Exercise type: Running, Weight lifting.   (Last Updated: 1/8/2021 2:58:02 PM CST by Leslie Amanda)          Sexual:        Sexually active: Yes.  Identifies as female, Sexual orientation: Straight or heterosexual.  History of STD: No.  History of sexual abuse: No.   (Last Updated: 1/8/2021 2:59:41 PM CST by Leslie Amanda)

## 2022-03-22 PROBLEM — J30.2 SEASONAL ALLERGIC RHINITIS: Status: ACTIVE | Noted: 2022-03-22

## 2022-03-22 PROBLEM — Z15.01 BRCA1 POSITIVE: Status: ACTIVE | Noted: 2022-03-22

## 2022-03-22 PROBLEM — Z15.09 BRCA1 POSITIVE: Status: ACTIVE | Noted: 2022-03-22

## 2022-03-23 ENCOUNTER — OFFICE VISIT (OUTPATIENT)
Dept: FAMILY MEDICINE | Facility: CLINIC | Age: 62
End: 2022-03-23
Payer: COMMERCIAL

## 2022-03-23 VITALS
WEIGHT: 126.9 LBS | DIASTOLIC BLOOD PRESSURE: 75 MMHG | OXYGEN SATURATION: 99 % | BODY MASS INDEX: 21.66 KG/M2 | TEMPERATURE: 97.5 F | HEIGHT: 64 IN | HEART RATE: 45 BPM | SYSTOLIC BLOOD PRESSURE: 130 MMHG

## 2022-03-23 DIAGNOSIS — H02.831 DERMATOCHALASIS OF BOTH UPPER EYELIDS: ICD-10-CM

## 2022-03-23 DIAGNOSIS — Z01.818 PREOP GENERAL PHYSICAL EXAM: Primary | ICD-10-CM

## 2022-03-23 DIAGNOSIS — H02.834 DERMATOCHALASIS OF BOTH UPPER EYELIDS: ICD-10-CM

## 2022-03-23 PROCEDURE — 99213 OFFICE O/P EST LOW 20 MIN: CPT | Performed by: FAMILY MEDICINE

## 2022-03-23 RX ORDER — FERROUS SULFATE 324(65)MG
TABLET, DELAYED RELEASE (ENTERIC COATED) ORAL
COMMUNITY

## 2022-03-23 RX ORDER — CIDER VINEGAR 300 MG
1 TABLET ORAL
COMMUNITY

## 2022-03-23 ASSESSMENT — ENCOUNTER SYMPTOMS
ENDOCRINE NEGATIVE: 1
NEUROLOGICAL NEGATIVE: 1
CARDIOVASCULAR NEGATIVE: 1
CONSTITUTIONAL NEGATIVE: 1
MUSCULOSKELETAL NEGATIVE: 1
RESPIRATORY NEGATIVE: 1
HEMATOLOGIC/LYMPHATIC NEGATIVE: 1

## 2022-03-23 NOTE — PROGRESS NOTES
[address] [address]  Phone: [phone]  Fax: [phone]  Primary Provider: Nas Gonzalez        PREOPERATIVE EVALUATION:  Today's date: 3/23/2022    Laura Sidhu is a 62 year old female who presents for a preoperative evaluation.    Surgical Information:  Surgery/Procedure: Blepharoplasty Bilateral  Surgery Location: [facility]  Surgeon: Dr Giorgio Rivera  Surgery Date: 3/29/2022  Time of Surgery: unknown  Where patient plans to recover: At home with family  Fax number for surgical facility: [phone]    Type of Anesthesia Anticipated: General    Assessment & Plan     The proposed surgical procedure is considered LOW risk.    Preop general physical exam  Stable with normal exam.  Not on any medications and no increased risk of heart disease.  No further testing is indicated at this time.  Patient stable to proceed with surgery    Dermatochalasis of both upper eyelids  Affecting vision, appropriate to proceed with surgery as scheduled                   RECOMMENDATION:  APPROVAL GIVEN to proceed with proposed procedure, without further diagnostic evaluation.                      Subjective     HPI related to upcoming procedure: patient with increased difficulty with vision with ptosis, having corrective surgery       Preop Questions 3/23/2022   1. Have you ever had a heart attack or stroke? No   2. Have you ever had surgery on your heart or blood vessels, such as a stent placement, a coronary artery bypass, or surgery on an artery in your head, neck, heart, or legs? No   3. Do you have chest pain with activity? No   4. Do you have a history of  heart failure? No   5. Do you currently have a cold, bronchitis or symptoms of other infection? No   6. Do you have a cough, shortness of breath, or wheezing? No   7. Do you or anyone in your family have previous history of blood clots? No   8. Do you or does anyone in your family have a serious  bleeding problem such as prolonged bleeding following surgeries or cuts? No   9. Have you ever had problems with anemia or been told to take iron pills? YES - takes supplement to keep iron level up   10. Have you had any abnormal blood loss such as black, tarry or bloody stools, or abnormal vaginal bleeding? No   11. Have you ever had a blood transfusion? No   12. Are you willing to have a blood transfusion if it is medically needed before, during, or after your surgery? Yes   13. Have you or any of your relatives ever had problems with anesthesia? No   14. Do you have sleep apnea, excessive snoring or daytime drowsiness? No   15. Do you have any artifical heart valves or other implanted medical devices like a pacemaker, defibrillator, or continuous glucose monitor? No   16. Do you have artificial joints? No   17. Are you allergic to latex? No   18. Is there any chance that you may be pregnant? No     Health Care Directive:  Patient does not have a Health Care Directive or Living Will: Discussed advance care planning with patient; however, patient declined at this time.    Preoperative Review of :   reviewed - no record of controlled substances prescribed.          Review of Systems   Constitutional: Negative.    HENT: Negative.    Eyes:        Negative except per HPI     Respiratory: Negative.    Cardiovascular: Negative.    Gastrointestinal:        Had gastroenteritis last week, feels fully recovered   Endocrine: Negative.    Genitourinary: Negative.    Musculoskeletal: Negative.    Neurological: Negative.    Hematological: Negative.          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  Past Surgical  "History:   Procedure Laterality Date     BILATERAL OOPHORECTOMY       BUNIONECTOMY       CATARACT EXTRACTION Bilateral      LUMPECTOMY BREAST       MASTECTOMY       TN REVISE BREAST RECONSTRUCTION       Current Outpatient Medications   Medication Sig Dispense Refill     cholecalciferol 50 MCG (2000 UT) CAPS Take 2,000 Units by mouth       Ferrous Sulfate 324 (65 Fe) MG TBEC        MULTIPLE VITAMIN PO Take 1 capsule by mouth       Omega-3 Fatty Acids (FISH OIL-OMEGA-3 FATTY ACID) 1000 MG DR capsule Take 1 capsule by mouth         Allergies   Allergen Reactions     Penicillin G      Amoxicillin Rash     Sulfa Drugs Rash        Social History     Tobacco Use     Smoking status: Never Smoker     Smokeless tobacco: Never Used   Substance Use Topics     Alcohol use: Not on file     Family History   Problem Relation Age of Onset     Coronary Artery Disease Mother      Pancreatic Cancer Mother      Myocardial Infarction Mother      Impaired Fasting Glucose Mother      Myocardial Infarction Father      Hyperlipidemia Father      Hypertension Father      Diabetes Father      Coronary Artery Disease Father 75        CABG     Skin Cancer Father      Hypertension Brother      Hypertension Brother      Diabetes Brother      Prostate Cancer Maternal Grandfather      Breast Cancer Maternal Aunt      Anesthesia Reaction No family hx of      History   Drug Use Not on file         Objective     /75 (BP Location: Right arm, Patient Position: Sitting, Cuff Size: Adult Regular)   Pulse (!) 45   Temp 97.5  F (36.4  C)   Ht 1.632 m (5' 4.25\")   Wt 57.6 kg (126 lb 14.4 oz)   SpO2 99%   BMI 21.61 kg/m      Physical Exam    GENERAL APPEARANCE: healthy, alert and no distress     EYES: EOMI, PERRL     HENT: ear canals and TM's normal and nose and mouth without ulcers or lesions     NECK: no adenopathy, no asymmetry, masses, or scars and thyroid normal to palpation     RESP: lungs clear to auscultation - no rales, rhonchi or " wheezes     CV: regular rates and rhythm, normal S1 S2, no S3 or S4 and no murmur, click or rub     ABDOMEN:  soft, nontender, no HSM or masses and bowel sounds normal     MS: extremities normal- no gross deformities noted, no evidence of inflammation in joints, FROM in all extremities.     SKIN: no suspicious lesions or rashes     NEURO: Normal strength and tone, sensory exam grossly normal, mentation intact and speech normal     PSYCH: mentation appears normal. and affect normal/bright     LYMPHATICS: No cervical adenopathy    No results for input(s): HGB, PLT, INR, NA, POTASSIUM, CR, A1C in the last 40551 hours.     Diagnostics:  No labs were ordered during this visit.   No EKG required, no history of coronary heart disease, significant arrhythmia, peripheral arterial disease or other structural heart disease.    Revised Cardiac Risk Index (RCRI):  The patient has the following serious cardiovascular risks for perioperative complications:   - No serious cardiac risks = 0 points     RCRI Interpretation: 0 points: Class I (very low risk - 0.4% complication rate)           Signed Electronically by: Nas Gonzalez MD  Copy of this evaluation report is provided to requesting physician.

## 2022-03-23 NOTE — PATIENT INSTRUCTIONS
Preparing for Your Surgery  Getting started  A nurse will call you to review your health history and instructions. They will give you an arrival time based on your scheduled surgery time. Please be ready to share:    Your doctor's clinic name and phone number    Your medical, surgical and anesthesia history    A list of allergies and sensitivities    A list of medicines, including herbal treatments and over-the-counter drugs    Whether the patient has a legal guardian (ask how to send us the papers in advance)  Please tell us if you're pregnant--or if there's any chance you might be pregnant. Some surgeries may injure a fetus (unborn baby), so they require a pregnancy test. Surgeries that are safe for a fetus don't always need a test, and you can choose whether to have one.   If you have a child who's having surgery, please ask for a copy of Preparing for Your Child's Surgery.    Preparing for surgery    Within 30 days of surgery: Have a pre-op exam (sometimes called an H&P, or History and Physical). This can be done at a clinic or pre-operative center.  ? If you're having a , you may not need this exam. Talk to your care team.    At your pre-op exam, talk to your care team about all medicines you take. If you need to stop any medicines before surgery, ask when to start taking them again.  ? We do this for your safety. Many medicines can make you bleed too much during surgery. Some change how well surgery (anesthesia) drugs work.    Call your insurance company to let them know you're having surgery. (If you don't have insurance, call [phone].)    Call your clinic if there's any change in your health. This includes signs of a cold or flu (sore throat, runny nose, cough, rash, fever). It also includes a scrape or scratch near the surgery site.    If you have questions on the day of surgery, call your hospital or surgery center.  COVID testing  You may need to be tested for COVID-19 before having  surgery. If so, your surgical team will give you instructions for scheduling this test, separate from your preoperative history and physical.  Eating and drinking guidelines  For your safety: Unless your surgeon tells you otherwise, follow the guidelines below.    Eat and drink as usual until 8 hours before surgery. After that, no food or milk.    Drink clear liquids until 2 hours before surgery. These are liquids you can see through, like water, Gatorade and Propel Water. You may also have black coffee and tea (no cream or milk).    Nothing by mouth within 2 hours of surgery. This includes gum, candy and breath mints.    If you drink alcohol: Stop drinking it the night before surgery.    If your care team tells you to take medicine on the morning of surgery, it's okay to take it with a sip of water.  Preventing infection    Shower or bathe the night before and morning of your surgery. Follow the instructions your clinic gave you. (If no instructions, use regular soap.)    Don't shave or clip hair near your surgery site. We'll remove the hair if needed.    Don't smoke or vape the morning of surgery. You may chew nicotine gum up to 2 hours before surgery. A nicotine patch is okay.  ? Note: Some surgeries require you to completely quit smoking and nicotine. Check with your surgeon.    Your care team will make every effort to keep you safe from infection. We will:  ? Clean our hands often with soap and water (or an alcohol-based hand rub).  ? Clean the skin at your surgery site with a special soap that kills germs.  ? Give you a special gown to keep you warm. (Cold raises the risk of infection.)  ? Wear special hair covers, masks, gowns and gloves during surgery.  ? Give antibiotic medicine, if prescribed. Not all surgeries need antibiotics.  What to bring on the day of surgery    Photo ID and insurance card    Copy of your health care directive, if you have one    Glasses and hearing aides (bring cases)  ? You can't  wear contacts during surgery    Inhaler and eye drops, if you use them (tell us about these when you arrive)    CPAP machine or breathing device, if you use them    A few personal items, if spending the night    If you have . . .  ? A pacemaker, ICD (cardiac defibrillator) or other implant: Bring the ID card.  ? An implanted stimulator: Bring the remote control.  ? A legal guardian: Bring a copy of the certified (court-stamped) guardianship papers.  Please remove any jewelry, including body piercings. Leave jewelry and other valuables at home.  If you're going home the day of surgery    You must have a responsible adult drive you home. They should stay with you overnight as well.    If you don't have someone to stay with you, and you aren't safe to go home alone, we may keep you overnight. Insurance often won't pay for this.  After surgery  If it's hard to control your pain or you need more pain medicine, please call your surgeon's office.  Questions?   If you have any questions for your care team, list them here: _________________________________________________________________________________________________________________________________________________________________________ ____________________________________ ____________________________________ ____________________________________  For informational purposes only. Not to replace the advice of your health care provider. Copyright   2003, 2019 [facility]. All rights reserved. Clinically reviewed by Kinsey Rosenberg MD. [employer] [id number] - REV 07/21.

## 2022-04-03 ENCOUNTER — HEALTH MAINTENANCE LETTER (OUTPATIENT)
Age: 62
End: 2022-04-03

## 2022-05-12 ENCOUNTER — ALLIED HEALTH/NURSE VISIT (OUTPATIENT)
Dept: FAMILY MEDICINE | Facility: CLINIC | Age: 62
End: 2022-05-12
Payer: COMMERCIAL

## 2022-05-12 VITALS — SYSTOLIC BLOOD PRESSURE: 140 MMHG | DIASTOLIC BLOOD PRESSURE: 85 MMHG | HEART RATE: 48 BPM

## 2022-05-12 DIAGNOSIS — Z01.30 BP CHECK: Primary | ICD-10-CM

## 2022-05-12 PROCEDURE — 99207 PR NO CHARGE NURSE ONLY: CPT | Performed by: FAMILY MEDICINE

## 2022-05-12 NOTE — PROGRESS NOTES
Pt came to the clinic to calibrate her father's wrist BP cuff. BP taken with clinic BP cuff and wrist cuff.

## 2022-10-03 ENCOUNTER — HEALTH MAINTENANCE LETTER (OUTPATIENT)
Age: 62
End: 2022-10-03

## 2022-10-05 ENCOUNTER — TRANSFERRED RECORDS (OUTPATIENT)
Dept: HEALTH INFORMATION MANAGEMENT | Facility: CLINIC | Age: 62
End: 2022-10-05

## 2022-10-21 ENCOUNTER — OFFICE VISIT (OUTPATIENT)
Dept: FAMILY MEDICINE | Facility: CLINIC | Age: 62
End: 2022-10-21
Payer: COMMERCIAL

## 2022-10-21 VITALS
HEART RATE: 48 BPM | HEIGHT: 64 IN | TEMPERATURE: 98.4 F | BODY MASS INDEX: 21.6 KG/M2 | WEIGHT: 126.5 LBS | SYSTOLIC BLOOD PRESSURE: 120 MMHG | DIASTOLIC BLOOD PRESSURE: 72 MMHG | OXYGEN SATURATION: 98 %

## 2022-10-21 DIAGNOSIS — Z12.11 SCREEN FOR COLON CANCER: ICD-10-CM

## 2022-10-21 DIAGNOSIS — Z00.00 ROUTINE GENERAL MEDICAL EXAMINATION AT A HEALTH CARE FACILITY: ICD-10-CM

## 2022-10-21 PROCEDURE — 90715 TDAP VACCINE 7 YRS/> IM: CPT | Performed by: FAMILY MEDICINE

## 2022-10-21 PROCEDURE — 90472 IMMUNIZATION ADMIN EACH ADD: CPT | Performed by: FAMILY MEDICINE

## 2022-10-21 PROCEDURE — 90471 IMMUNIZATION ADMIN: CPT | Performed by: FAMILY MEDICINE

## 2022-10-21 PROCEDURE — 90682 RIV4 VACC RECOMBINANT DNA IM: CPT | Performed by: FAMILY MEDICINE

## 2022-10-21 PROCEDURE — 0134A COVID-19,PF,MODERNA BIVALENT: CPT | Performed by: FAMILY MEDICINE

## 2022-10-21 PROCEDURE — 91313 COVID-19,PF,MODERNA BIVALENT: CPT | Performed by: FAMILY MEDICINE

## 2022-10-21 PROCEDURE — 99396 PREV VISIT EST AGE 40-64: CPT | Mod: 25 | Performed by: FAMILY MEDICINE

## 2022-10-21 ASSESSMENT — ENCOUNTER SYMPTOMS
FREQUENCY: 0
DIZZINESS: 0
DYSURIA: 0
PARESTHESIAS: 0
DIARRHEA: 0
NAUSEA: 0
WEAKNESS: 0
CHILLS: 0
HEARTBURN: 0
HEMATURIA: 0
FEVER: 0
HEADACHES: 0
SHORTNESS OF BREATH: 0
CONSTIPATION: 0
MYALGIAS: 0
EYE PAIN: 0
BREAST MASS: 0
SORE THROAT: 0
JOINT SWELLING: 0
NERVOUS/ANXIOUS: 0
COUGH: 0
ARTHRALGIAS: 0
PALPITATIONS: 0
HEMATOCHEZIA: 0
ABDOMINAL PAIN: 0

## 2022-10-21 NOTE — PROGRESS NOTES
SUBJECTIVE:   CC: Laura is an 62 year old who presents for preventive health visit.   Patient would like to get a flu and COVID shot today if possible.    {Healthy Habits:     Getting at least 3 servings of Calcium per day:  Yes    Bi-annual eye exam:  Yes    Dental care twice a year:  Yes    Sleep apnea or symptoms of sleep apnea:  None    Diet:  Regular (no restrictions)    Frequency of exercise:  6-7 days/week    Duration of exercise:  45-60 minutes    Taking medications regularly:  Not Applicable    Medication side effects:  Not applicable    PHQ-2 Total Score: 0    Additional concerns today:  No        Today's PHQ-2 Score:   PHQ-2 ( 1999 Pfizer) 10/21/2022   Q1: Little interest or pleasure in doing things 0   Q2: Feeling down, depressed or hopeless 0   PHQ-2 Score 0   Q1: Little interest or pleasure in doing things Not at all   Q2: Feeling down, depressed or hopeless Not at all   PHQ-2 Score 0       Abuse: Current or Past (Physical, Sexual or Emotional) - No  Do you feel safe in your environment? Yes    Have you ever done Advance Care Planning? (For example, a Health Directive, POLST, or a discussion with a medical provider or your loved ones about your wishes): Yes, patient states has an Advance Care Planning document and will bring a copy to the clinic.    Social History     Tobacco Use     Smoking status: Never     Smokeless tobacco: Never   Substance Use Topics     Alcohol use: Not on file     If you drink alcohol do you typically have >3 drinks per day or >7 drinks per week? No    Alcohol Use 10/21/2022   Prescreen: >3 drinks/day or >7 drinks/week? No   Prescreen: >3 drinks/day or >7 drinks/week? -   Reviewed orders with patient.  Reviewed health maintenance and updated orders accordingly - Yes      Breast Cancer Screening:    FHS-7:   Breast CA Risk Assessment (FHS-7) 3/23/2022 10/21/2022   Did any of your first-degree relatives have breast or ovarian cancer? No Yes   Did any of your relatives have  bilateral breast cancer? No No   Did any man in your family have breast cancer? No No   Did any woman in your family have breast and ovarian cancer? Yes Yes   Did any woman in your family have breast cancer before age 50 y? No No   Do you have 2 or more relatives with breast and/or ovarian cancer? Yes Yes   Do you have 2 or more relatives with breast and/or bowel cancer? Yes Yes     Patient has had bilateral mastectomy and does not need additional mammograms    Pertinent mammograms are reviewed under the imaging tab.    History of abnormal Pap smear: Patient with remote history of abnormal Pap smear at least 20 years ago with colposcopy that was benign and no additional abnormal Pap smears.     Reviewed and updated as needed this visit by clinical staff   Tobacco  Allergies  Meds  Problems  Med Hx  Surg Hx  Fam Hx          Reviewed and updated as needed this visit by Provider   Tobacco  Allergies  Meds  Problems  Med Hx  Surg Hx  Fam Hx             Review of Systems   Constitutional: Negative for chills and fever.   HENT: Negative for congestion, ear pain, hearing loss and sore throat.    Eyes: Negative for pain and visual disturbance.   Respiratory: Negative for cough and shortness of breath.    Cardiovascular: Negative for chest pain, palpitations and peripheral edema.   Gastrointestinal: Negative for abdominal pain, constipation, diarrhea, heartburn, hematochezia and nausea.   Breasts:  Negative for tenderness, breast mass and discharge.   Genitourinary: Negative for dysuria, frequency, genital sores, hematuria, pelvic pain, urgency, vaginal bleeding and vaginal discharge.   Musculoskeletal: Negative for arthralgias, joint swelling and myalgias.   Skin: Negative for rash.   Neurological: Negative for dizziness, weakness, headaches and paresthesias.   Psychiatric/Behavioral: Negative for mood changes. The patient is not nervous/anxious.           OBJECTIVE:   /72 (BP Location: Right arm,  "Patient Position: Sitting)   Pulse (!) 48   Temp 98.4  F (36.9  C)   Ht 1.632 m (5' 4.25\")   Wt 57.4 kg (126 lb 8 oz)   SpO2 98%   BMI 21.55 kg/m    Physical Exam  GENERAL: healthy, alert and no distress  EYES: Eyes grossly normal to inspection, PERRL and conjunctivae and sclerae normal  HENT: ear canals and TM's normal, nose and mouth without ulcers or lesions  NECK: no adenopathy, no asymmetry, masses, or scars and thyroid normal to palpation  RESP: lungs clear to auscultation - no rales, rhonchi or wheezes  CV: bradycardia, normal S1 S2, no S3 or S4, no murmur, click or rub and no peripheral edema  ABDOMEN: soft, nontender, no hepatosplenomegaly, no masses and bowel sounds normal  MS: no gross musculoskeletal defects noted, no edema  SKIN: no suspicious lesions or rashes  NEURO: Normal strength and tone, mentation intact and speech normal  PSYCH: mentation appears normal, affect normal/bright        ASSESSMENT/PLAN:   Routine general medical examination at a health care facility  Health maintenance updated, preventive services reviewed, vaccines discussed, questions are answered, patient encouraged to continue annual wellness visits to review preventive services      Screen for colon cancer  Discussed options for colon cancer screening, would like to proceed with colonoscopy.  Requests to do that at [facility].  - Colonoscopy Screening  Referral; Future      COUNSELING:  Reviewed preventive health counseling, as reflected in patient instructions       Regular exercise       Healthy diet/nutrition       Colorectal Cancer Screening    Estimated body mass index is 21.55 kg/m  as calculated from the following:    Height as of this encounter: 1.632 m (5' 4.25\").    Weight as of this encounter: 57.4 kg (126 lb 8 oz).        She reports that she has never smoked. She has never used smokeless tobacco.      Counseling Resources:  ATP IV Guidelines  Pooled Cohorts Equation Calculator  Breast " Cancer Risk Calculator  BRCA-Related Cancer Risk Assessment: FHS-7 Tool  FRAX Risk Assessment  ICSI Preventive Guidelines  Dietary Guidelines for Americans, 2010  USDA's MyPlate  ASA Prophylaxis  Lung CA Screening    Nas Gonzalez MD  [facility]

## 2022-10-21 NOTE — PATIENT INSTRUCTIONS
We have referred you for colonoscopy that will be done at an outside facility. The request will be routed to the outside facility in the next 24 hours. Please call the facility directly on the next business day to schedule your appointment.  [facility] - [phone]      If you are having difficulty getting the test scheduled or have other questions or concerns, or if you had your imaging done and you have not heard from us within 2 business days regarding the results, please contact the clinic at [phone].    Preventive Health Recommendations  Female Ages 50 - 64    Yearly exam: See your health care provider every year in order to  Review health changes.   Discuss preventive care.    Review your medicines if your doctor has prescribed any.    Get a Pap test every three years (unless you have an abnormal result and your provider advises testing more often).  If you get Pap tests with HPV test, you only need to test every 5 years, unless you have an abnormal result.   You do not need a Pap test if your uterus was removed (hysterectomy) and you have not had cancer.  You should be tested each year for STDs (sexually transmitted diseases) if you're at risk.   Have a mammogram every 1 to 2 years.  Have a colonoscopy at age 50, or have a yearly FIT test (stool test). These exams screen for colon cancer.    Have a cholesterol test every 5 years, or more often if advised.  Have a diabetes test (fasting glucose) every three years. If you are at risk for diabetes, you should have this test more often.   If you are at risk for osteoporosis (brittle bone disease), think about having a bone density scan (DEXA).    Shots: Get a flu shot each year. Get a tetanus shot every 10 years.    Nutrition:   Eat at least 5 servings of fruits and vegetables each day.  Eat whole-grain bread, whole-wheat pasta and brown rice instead of white grains and rice.  Get adequate Calcium and Vitamin D.     Lifestyle  Exercise at least  150 minutes a week (30 minutes a day, 5 days a week). This will help you control your weight and prevent disease.  Limit alcohol to one drink per day.  No smoking.   Wear sunscreen to prevent skin cancer.   See your dentist every six months for an exam and cleaning.  See your eye doctor every 1 to 2 years.

## 2023-07-31 ENCOUNTER — TRANSFERRED RECORDS (OUTPATIENT)
Dept: HEALTH INFORMATION MANAGEMENT | Facility: CLINIC | Age: 63
End: 2023-07-31
Payer: COMMERCIAL

## 2023-10-09 ENCOUNTER — TRANSFERRED RECORDS (OUTPATIENT)
Dept: HEALTH INFORMATION MANAGEMENT | Facility: CLINIC | Age: 63
End: 2023-10-09
Payer: COMMERCIAL

## 2023-11-01 ASSESSMENT — ENCOUNTER SYMPTOMS
FEVER: 0
PARESTHESIAS: 0
DIARRHEA: 0
PALPITATIONS: 0
CHILLS: 0
FREQUENCY: 0
ARTHRALGIAS: 0
SHORTNESS OF BREATH: 0
WEAKNESS: 0
JOINT SWELLING: 0
NAUSEA: 0
DYSURIA: 0
MYALGIAS: 0
COUGH: 0
CONSTIPATION: 0
BREAST MASS: 0
NERVOUS/ANXIOUS: 0
SORE THROAT: 0
HEARTBURN: 0
EYE PAIN: 0
HEADACHES: 0
HEMATURIA: 0
DIZZINESS: 0
HEMATOCHEZIA: 0
ABDOMINAL PAIN: 0

## 2023-11-08 ENCOUNTER — OFFICE VISIT (OUTPATIENT)
Dept: FAMILY MEDICINE | Facility: CLINIC | Age: 63
End: 2023-11-08
Payer: COMMERCIAL

## 2023-11-08 VITALS
HEIGHT: 64 IN | SYSTOLIC BLOOD PRESSURE: 138 MMHG | HEART RATE: 47 BPM | DIASTOLIC BLOOD PRESSURE: 72 MMHG | OXYGEN SATURATION: 97 % | WEIGHT: 128 LBS | RESPIRATION RATE: 14 BRPM | BODY MASS INDEX: 21.85 KG/M2

## 2023-11-08 DIAGNOSIS — Z00.00 ROUTINE GENERAL MEDICAL EXAMINATION AT A HEALTH CARE FACILITY: Primary | ICD-10-CM

## 2023-11-08 PROBLEM — Z17.1 ESTROGEN RECEPTOR NEGATIVE STATUS (ER-): Status: ACTIVE | Noted: 2023-11-08

## 2023-11-08 PROCEDURE — 99396 PREV VISIT EST AGE 40-64: CPT | Performed by: FAMILY MEDICINE

## 2023-11-08 ASSESSMENT — ENCOUNTER SYMPTOMS
PALPITATIONS: 0
MYALGIAS: 0
NERVOUS/ANXIOUS: 0
PARESTHESIAS: 0
WEAKNESS: 0
CONSTIPATION: 0
COUGH: 0
SHORTNESS OF BREATH: 0
HEARTBURN: 0
FEVER: 0
ABDOMINAL PAIN: 0
JOINT SWELLING: 0
HEADACHES: 0
NAUSEA: 0
ARTHRALGIAS: 0
HEMATURIA: 0
CHILLS: 0
DIARRHEA: 0
HEMATOCHEZIA: 0
SORE THROAT: 0
DYSURIA: 0
DIZZINESS: 0
EYE PAIN: 0
BREAST MASS: 0
FREQUENCY: 0

## 2023-11-08 NOTE — PROGRESS NOTES
SUBJECTIVE:   CC: Laura is an 63 year old who presents for preventive health visit.       11/8/2023    11:20 AM   Additional Questions   Roomed by Edna VILLAGOMEZ CMA   Accompanied by Lynda       Healthy Habits:     Getting at least 3 servings of Calcium per day:  Yes    Bi-annual eye exam:  Yes    Dental care twice a year:  Yes    Sleep apnea or symptoms of sleep apnea:  None    Diet:  Regular (no restrictions)    Frequency of exercise:  6-7 days/week    Duration of exercise:  45-60 minutes    Taking medications regularly:  Yes    Barriers to taking medications:  None    Medication side effects:  Not applicable    Additional concerns today:  No      Today's PHQ-2 Score:       11/8/2023    11:05 AM   PHQ-2 ( 1999 Pfizer)   Q1: Little interest or pleasure in doing things 0   Q2: Feeling down, depressed or hopeless 0   PHQ-2 Score 0   Q1: Little interest or pleasure in doing things Not at all   Q2: Feeling down, depressed or hopeless Not at all   PHQ-2 Score 0       Social History     Tobacco Use    Smoking status: Never     Passive exposure: Never    Smokeless tobacco: Never   Substance Use Topics    Alcohol use: Not on file             11/1/2023     1:11 PM   Alcohol Use   Prescreen: >3 drinks/day or >7 drinks/week? No     Reviewed orders with patient.  Reviewed health maintenance and updated orders accordingly - Yes      Breast Cancer Screening: bilateral mastectomy, no additional screening, sees oncology annually with scan    FHS-7:       3/23/2022     9:33 AM 10/21/2022     9:41 AM 11/1/2023     1:13 PM   Breast CA Risk Assessment (FHS-7)   Did any of your first-degree relatives have breast or ovarian cancer? No Yes Yes   Did any of your relatives have bilateral breast cancer? No No No   Did any man in your family have breast cancer? No No No   Did any woman in your family have breast and ovarian cancer? Yes Yes No   Did any woman in your family have breast cancer before age 50 y? No No No   Do you have 2 or more  "relatives with breast and/or ovarian cancer? Yes Yes No   Do you have 2 or more relatives with breast and/or bowel cancer? Yes Yes No       Mammogram Screening - Alternate mammogram schedule due to breast cancer history  Pertinent mammograms are reviewed under the imaging tab.    History of abnormal Pap smear: NO - age 30-65 PAP every 5 years with negative HPV co-testing recommended     Reviewed and updated as needed this visit by clinical staff   Tobacco  Allergies  Meds  Problems  Med Hx  Surg Hx  Fam Hx          Reviewed and updated as needed this visit by Provider   Tobacco  Allergies  Meds  Problems  Med Hx  Surg Hx  Fam Hx             Review of Systems   Constitutional:  Negative for chills and fever.   HENT:  Negative for congestion, ear pain, hearing loss and sore throat.    Eyes:  Negative for pain and visual disturbance.   Respiratory:  Negative for cough and shortness of breath.    Cardiovascular:  Negative for chest pain, palpitations and peripheral edema.   Gastrointestinal:  Negative for abdominal pain, constipation, diarrhea, heartburn, hematochezia and nausea.   Breasts:  Negative for tenderness, breast mass and discharge.   Genitourinary:  Negative for dysuria, frequency, genital sores, hematuria, pelvic pain, urgency, vaginal bleeding and vaginal discharge.   Musculoskeletal:  Negative for arthralgias, joint swelling and myalgias.   Skin:  Negative for rash.   Neurological:  Negative for dizziness, weakness, headaches and paresthesias.   Psychiatric/Behavioral:  Negative for mood changes. The patient is not nervous/anxious.           OBJECTIVE:   /72   Pulse (!) 47   Resp 14   Ht 1.629 m (5' 4.13\")   Wt 58.1 kg (128 lb)   LMP  (LMP Unknown)   SpO2 97%   BMI 21.88 kg/m    Physical Exam  GENERAL: healthy, alert and no distress  EYES: Eyes grossly normal to inspection, PERRL and conjunctivae and sclerae normal  HENT: ear canals and TM's normal, nose and mouth without ulcers " or lesions  NECK: no adenopathy, no asymmetry, masses, or scars and thyroid normal to palpation  RESP: lungs clear to auscultation - no rales, rhonchi or wheezes  CV: regular rate and rhythm, normal S1 S2, no S3 or S4, no murmur, click or rub, no peripheral edema and peripheral pulses strong  ABDOMEN: soft, nontender, no hepatosplenomegaly, no masses and bowel sounds normal  MS: no gross musculoskeletal defects noted, no edema  SKIN: no suspicious lesions or rashes  NEURO: Normal strength and tone, mentation intact and speech normal  PSYCH: mentation appears normal, affect normal/bright        ASSESSMENT/PLAN:   Routine general medical examination at a health care facility  Health maintenance updated, preventive services reviewed, vaccines discussed, questions are answered, patient encouraged to continue annual wellness visits to review preventive services            COUNSELING:  Reviewed preventive health counseling, as reflected in patient instructions        She reports that she has never smoked. She has never been exposed to tobacco smoke. She has never used smokeless tobacco.          Nas Gonzalez MD  [facility]

## 2024-08-12 ENCOUNTER — OFFICE VISIT (OUTPATIENT)
Dept: FAMILY MEDICINE | Facility: CLINIC | Age: 64
End: 2024-08-12
Payer: COMMERCIAL

## 2024-08-12 VITALS
SYSTOLIC BLOOD PRESSURE: 120 MMHG | TEMPERATURE: 98 F | OXYGEN SATURATION: 98 % | RESPIRATION RATE: 14 BRPM | BODY MASS INDEX: 21.39 KG/M2 | WEIGHT: 125.3 LBS | DIASTOLIC BLOOD PRESSURE: 66 MMHG | HEART RATE: 49 BPM | HEIGHT: 64 IN

## 2024-08-12 DIAGNOSIS — R00.1 BRADYCARDIA: Primary | ICD-10-CM

## 2024-08-12 PROCEDURE — 93000 ELECTROCARDIOGRAM COMPLETE: CPT | Performed by: PHYSICIAN ASSISTANT

## 2024-08-12 PROCEDURE — 99213 OFFICE O/P EST LOW 20 MIN: CPT | Performed by: PHYSICIAN ASSISTANT

## 2024-08-12 RX ORDER — ATROPINE SULFATE 10 MG/ML
1 SOLUTION/ DROPS OPHTHALMIC AT BEDTIME
COMMUNITY
Start: 2024-08-11

## 2024-08-12 NOTE — PROGRESS NOTES
"  Assessment & Plan     (R00.1) Bradycardia  (primary encounter diagnosis)  Comment: Asymptomatic  Plan: EKG 12-lead complete w/read - Clinics        EKG shows the sinus bradycardia and an incomplete right bundle tamara block unchanged from last EKG of December 2015 follow-up as needed                Subjective   Laura is a 64 year old, presenting for the following health issues:  Bradycardia ( )        8/12/2024     3:06 PM   Additional Questions   Roomed by EDE Ivey     Patient rents a clinic follow-up for bradycardia  She has a history of bradycardia as she is a long-distance runner.  Recently she had a detached retina in her left eye  She was scheduled for repair over the weekend they ended up doing a bubble procedure rather than what they were initially planning secondary to her bradycardia she thought she should follow-up  She is asymptomatic                       Objective    /66 (BP Location: Right arm, Patient Position: Sitting, Cuff Size: Adult Regular)   Pulse (!) 49   Temp 98  F (36.7  C) (Tympanic)   Resp 14   Ht 1.632 m (5' 4.25\")   Wt 56.8 kg (125 lb 4.8 oz)   LMP  (LMP Unknown)   SpO2 98%   BMI 21.34 kg/m    Body mass index is 21.34 kg/m .  Physical Exam alert attentive no acute distress  Lungs clear to ventilated cardiovascular regular in rate and rhythm              Signed Electronically by: ZARA Escamilla    "

## 2024-09-18 ENCOUNTER — TELEPHONE (OUTPATIENT)
Dept: SCHEDULING | Facility: CLINIC | Age: 64
End: 2024-09-18
Payer: COMMERCIAL

## 2024-09-18 NOTE — TELEPHONE ENCOUNTER
Reason for Call:  Appointment Request    Patient requesting this type of appt:  COVID AND FLU VACCINE    Requested provider: MA/VF/LPN Visit    Reason patient unable to be scheduled: Not within requested timeframe    When does patient want to be seen/preferred time:  9/25 10:30AM    Comments: Pt is requesting appointment for immunization. Father is scheduled same day, so pt would like to get hers done right after his appointment.    Could we send this information to you in [employer] or would you prefer to receive a phone call?:   Patient would prefer a phone call   Okay to leave a detailed message?: Yes at Home number on file [phone] (home)    Call taken on 9/18/2024 at 2:07 PM by Ruma Bateman

## 2024-09-25 ENCOUNTER — IMMUNIZATION (OUTPATIENT)
Dept: FAMILY MEDICINE | Facility: CLINIC | Age: 64
End: 2024-09-25
Payer: COMMERCIAL

## 2024-09-25 VITALS — TEMPERATURE: 97.7 F

## 2024-09-25 DIAGNOSIS — Z23 ENCOUNTER FOR IMMUNIZATION: Primary | ICD-10-CM

## 2024-09-25 PROCEDURE — 99207 PR NO CHARGE NURSE ONLY: CPT

## 2024-09-25 PROCEDURE — 90480 ADMN SARSCOV2 VAC 1/ONLY CMP: CPT

## 2024-09-25 PROCEDURE — 91320 SARSCV2 VAC 30MCG TRS-SUC IM: CPT

## 2024-09-25 PROCEDURE — 90673 RIV3 VACCINE NO PRESERV IM: CPT

## 2024-09-25 PROCEDURE — 90471 IMMUNIZATION ADMIN: CPT

## 2024-09-25 NOTE — PROGRESS NOTES
Prior to immunization administration, verified patients identity using patient s name and date of birth. Please see Immunization Activity for additional information.     Is the patient's temperature normal (100.5 or less)? Yes     Patient MEETS CRITERIA. PROCEED with vaccine administration.      Patient instructed to remain in clinic for 15 minutes afterwards, and to report any adverse reactions.      Link to Ancillary Visit Immunization Standing Orders SmartSet     Screening performed by Jihan Estrada LPN on 9/25/2024 at 10:11 AM.

## 2024-10-02 ENCOUNTER — TRANSFERRED RECORDS (OUTPATIENT)
Dept: HEALTH INFORMATION MANAGEMENT | Facility: CLINIC | Age: 64
End: 2024-10-02
Payer: COMMERCIAL

## 2024-12-23 ENCOUNTER — OFFICE VISIT (OUTPATIENT)
Dept: FAMILY MEDICINE | Facility: CLINIC | Age: 64
End: 2024-12-23
Payer: COMMERCIAL

## 2024-12-23 VITALS
RESPIRATION RATE: 10 BRPM | HEART RATE: 94 BPM | SYSTOLIC BLOOD PRESSURE: 112 MMHG | HEIGHT: 64 IN | TEMPERATURE: 97.3 F | DIASTOLIC BLOOD PRESSURE: 66 MMHG | OXYGEN SATURATION: 98 % | WEIGHT: 125.6 LBS | BODY MASS INDEX: 21.44 KG/M2

## 2024-12-23 DIAGNOSIS — Z00.00 ROUTINE GENERAL MEDICAL EXAMINATION AT A HEALTH CARE FACILITY: Primary | ICD-10-CM

## 2024-12-23 PROCEDURE — 99396 PREV VISIT EST AGE 40-64: CPT | Performed by: FAMILY MEDICINE

## 2024-12-23 SDOH — HEALTH STABILITY: PHYSICAL HEALTH: ON AVERAGE, HOW MANY DAYS PER WEEK DO YOU ENGAGE IN MODERATE TO STRENUOUS EXERCISE (LIKE A BRISK WALK)?: 7 DAYS

## 2024-12-23 ASSESSMENT — SOCIAL DETERMINANTS OF HEALTH (SDOH): HOW OFTEN DO YOU GET TOGETHER WITH FRIENDS OR RELATIVES?: TWICE A WEEK

## 2024-12-23 NOTE — PROGRESS NOTES
Preventive Care Visit  [facility]  Nas Gonzalez MD, Family Medicine  Dec 23, 2024      Assessment & Plan     Routine general medical examination at a health care facility  Health maintenance updated, preventive services reviewed, vaccines discussed, questions are answered, patient encouraged to continue annual wellness visits to review preventive services              Counseling  Appropriate preventive services were addressed with this patient via screening, questionnaire, or discussion as appropriate for fall prevention, nutrition, physical activity, Tobacco-use cessation, social engagement, weight loss and cognition.  Checklist reviewing preventive services available has been given to the patient.  Reviewed patient's diet, addressing concerns and/or questions.           Jet Sanchez is a 64 year old, presenting for the following:  Physical (Patient states no questions/concerns. )        12/23/2024     3:05 PM   Additional Questions   Roomed by Edna VILLAGOMEZ CMA   Accompanied by None        Here for annual exam, no concerns  History of breast cancer, had bilateral mastectomy and bilateral oophorectomy        Health Care Directive  Patient does not have a Health Care Directive: Patient states has Advance Directive and will bring in a copy to clinic.      12/23/2024   General Health   How would you rate your overall physical health? Excellent   Feel stress (tense, anxious, or unable to sleep) Not at all         12/23/2024   Nutrition   Three or more servings of calcium each day? Yes   Diet: Regular (no restrictions)   How many servings of fruit and vegetables per day? 4 or more   How many sweetened beverages each day? 0-1         12/23/2024   Exercise   Days per week of moderate/strenous exercise 7 days         12/23/2024   Social Factors   Frequency of gathering with friends or relatives Twice a week   Worry food won't last until get money to buy more No   Food not last or not have  enough money for food? No   Do you have housing? (Housing is defined as stable permanent housing and does not include staying ouside in a car, in a tent, in an abandoned building, in an overnight shelter, or couch-surfing.) Yes   Are you worried about losing your housing? No   Lack of transportation? No   Unable to get utilities (heat,electricity)? No         12/23/2024   Fall Risk   Fallen 2 or more times in the past year? No   Trouble with walking or balance? No          12/23/2024   Dental   Dentist two times every year? Yes         12/23/2024   TB Screening   Were you born outside of the [place]? No           Today's PHQ-2 Score:       12/23/2024     3:08 PM   PHQ-2 ( 1999 Pfizer)   Q1: Little interest or pleasure in doing things 0   Q2: Feeling down, depressed or hopeless 0   PHQ-2 Score 0         12/23/2024   Substance Use   Alcohol more than 3/day or more than 7/wk No   Do you use any other substances recreationally? No     Social History     Tobacco Use    Smoking status: Never     Passive exposure: Never    Smokeless tobacco: Never   Vaping Use    Vaping status: Never Used           11/1/2023   LAST FHS-7 RESULTS   1st degree relative breast or ovarian cancer Yes   Any relative bilateral breast cancer No   Any male have breast cancer No   Any ONE woman have BOTH breast AND ovarian cancer No   Any woman with breast cancer before 50yrs No   2 or more relatives with breast AND/OR ovarian cancer No   2 or more relatives with breast AND/OR bowel cancer No                12/23/2024   STI Screening   New sexual partner(s) since last STI/HIV test? No     History of abnormal Pap smear: No - age 30- 64 PAP with HPV every 5 years recommended       ASCVD Risk   The 10-year ASCVD risk score (Carlos Enrique CONSTANTINO, et al., 2019) is: 2.7%    Values used to calculate the score:      Age: 64 years      Sex: Female      Is Non- : No      Diabetic: No      Tobacco smoker: No      Systolic Blood Pressure: 112  "mmHg      Is BP treated: No      HDL Cholesterol: 78 mg/dL      Total Cholesterol: 219 mg/dL           Reviewed and updated as needed this visit by Provider   Tobacco  Allergies  Meds  Problems  Med Hx  Surg Hx  Fam Hx                     Objective    Exam  /66   Pulse 94   Temp 97.3  F (36.3  C)   Resp 10   Ht 1.626 m (5' 4\")   Wt 57 kg (125 lb 9.6 oz)   LMP  (LMP Unknown)   SpO2 98%   BMI 21.56 kg/m     Estimated body mass index is 21.56 kg/m  as calculated from the following:    Height as of this encounter: 1.626 m (5' 4\").    Weight as of this encounter: 57 kg (125 lb 9.6 oz).    Physical Exam  GENERAL: alert and no distress  EYES: Eyes grossly normal to inspection, PERRL and conjunctivae and sclerae normal  HENT: ear canals and TM's normal, nose and mouth without ulcers or lesions  NECK: no adenopathy, no asymmetry, masses, or scars  RESP: lungs clear to auscultation - no rales, rhonchi or wheezes  BREAST: Status post bilateral mastectomy, no masses or axillary lymph adenopathy appreciated  CV: regular rate and rhythm, heart rate approximately 60 by auscultation, normal S1 S2, no S3 or S4, no murmur, click or rub, no peripheral edema  ABDOMEN: soft, nontender, no hepatosplenomegaly, no masses and bowel sounds normal  MS: no gross musculoskeletal defects noted, no edema  SKIN: no suspicious lesions or rashes  NEURO: Normal strength and tone, mentation intact and speech normal  PSYCH: mentation appears normal, affect normal/bright        Signed Electronically by: Nas Gonzalez MD    "

## 2024-12-23 NOTE — PATIENT INSTRUCTIONS
Patient Education   Preventive Care Advice   This is general advice given by our system to help you stay healthy. However, your care team may have specific advice just for you. Please talk to your care team about your preventive care needs.  Nutrition  Eat 5 or more servings of fruits and vegetables each day.  Try wheat bread, brown rice and whole grain pasta (instead of white bread, rice, and pasta).  Get enough calcium and vitamin D. Check the label on foods and aim for 100% of the RDA (recommended daily allowance).  Lifestyle  Exercise at least 150 minutes each week  (30 minutes a day, 5 days a week).  Do muscle strengthening activities 2 days a week. These help control your weight and prevent disease.  No smoking.  Wear sunscreen to prevent skin cancer.  Have a dental exam and cleaning every 6 months.  Yearly exams  See your health care team every year to talk about:  Any changes in your health.  Any medicines your care team has prescribed.  Preventive care, family planning, and ways to prevent chronic diseases.  Shots (vaccines)   HPV shots (up to age 26), if you've never had them before.  Hepatitis B shots (up to age 59), if you've never had them before.  COVID-19 shot: Get this shot when it's due.  Flu shot: Get a flu shot every year.  Tetanus shot: Get a tetanus shot every 10 years.  Pneumococcal, hepatitis A, and RSV shots: Ask your care team if you need these based on your risk.  Shingles shot (for age 50 and up)  General health tests  Diabetes screening:  Starting at age 35, Get screened for diabetes at least every 3 years.  If you are younger than age 35, ask your care team if you should be screened for diabetes.  Cholesterol test: At age 39, start having a cholesterol test every 5 years, or more often if advised.  Bone density scan (DEXA): At age 50, ask your care team if you should have this scan for osteoporosis (brittle bones).  Hepatitis C: Get tested at least once in your life.  STIs (sexually  transmitted infections)  Before age 24: Ask your care team if you should be screened for STIs.  After age 24: Get screened for STIs if you're at risk. You are at risk for STIs (including HIV) if:  You are sexually active with more than one person.  You don't use condoms every time.  You or a partner was diagnosed with a sexually transmitted infection.  If you are at risk for HIV, ask about PrEP medicine to prevent HIV.  Get tested for HIV at least once in your life, whether you are at risk for HIV or not.  Cancer screening tests  Cervical cancer screening: If you have a cervix, begin getting regular cervical cancer screening tests starting at age 21.  Breast cancer scan (mammogram): If you've ever had breasts, begin having regular mammograms starting at age 40. This is a scan to check for breast cancer.  Colon cancer screening: It is important to start screening for colon cancer at age 45.  Have a colonoscopy test every 10 years (or more often if you're at risk) Or, ask your provider about stool tests like a FIT test every year or Cologuard test every 3 years.  To learn more about your testing options, visit:   .  For help making a decision, visit:   https://bit.ly/rh18773.  Prostate cancer screening test: If you have a prostate, ask your care team if a prostate cancer screening test (PSA) at age 55 is right for you.  Lung cancer screening: If you are a current or former smoker ages 50 to 80, ask your care team if ongoing lung cancer screenings are right for you.  For informational purposes only. Not to replace the advice of your health care provider. Copyright   2023 [employer]. All rights reserved. Clinically reviewed by the [facility]. [employer] 395825 - REV 01/24.

## 2025-03-04 ENCOUNTER — TRANSFERRED RECORDS (OUTPATIENT)
Dept: HEALTH INFORMATION MANAGEMENT | Facility: CLINIC | Age: 65
End: 2025-03-04
Payer: COMMERCIAL

## 2025-07-02 ENCOUNTER — VIRTUAL VISIT (OUTPATIENT)
Dept: FAMILY MEDICINE | Facility: CLINIC | Age: 65
End: 2025-07-02
Payer: COMMERCIAL

## 2025-07-02 DIAGNOSIS — L30.9 DERMATITIS: Primary | ICD-10-CM

## 2025-07-02 PROCEDURE — 98005 SYNCH AUDIO-VIDEO EST LOW 20: CPT | Performed by: FAMILY MEDICINE

## 2025-07-02 RX ORDER — TRIAMCINOLONE ACETONIDE 1 MG/G
CREAM TOPICAL 2 TIMES DAILY
Qty: 80 G | Refills: 1 | Status: SHIPPED | OUTPATIENT
Start: 2025-07-02

## 2025-07-02 NOTE — PROGRESS NOTES
Laura is a 65 year old who is being evaluated via a billable video visit.    How would you like to obtain your AVS? MyChart  If the video visit is dropped, the invitation should be resent by: Text to cell phone: [phone]  Will anyone else be joining your video visit? No      Assessment/Plan:    Dermatitis  Unclear etiology based on history and video exam - recommend trial of triamcinolone - discussed could use antihistamine and cool packs to help with itching if needed. If not resolving if 7 days then would recommend in person evaluationo.  - triamcinolone (KENALOG) 0.1 % external cream  Dispense: 80 g; Refill: 1       Follow up: as needed    Leyla Alcaraz MD  [facility]      Subjective:   Laura Sidhu is a 65 year old female being seen today via video visit for rash    -started a couple weeks - she wonders if it started as poison ivy and then other spots popped up and these are different  -red spots, a little raised  -a little itchy - not painful - no draining  -located on stomach, back, shoulders, a couple on her groin and upper legs  -no new things in life that would trigger this (soaps, meds etc)  -no illnesses    Answers submitted by the patient for this visit:  General Questionnaire (Submitted on 7/2/2025)  Chief Complaint: Chronic problems general questions HPI Form  What is the reason for your visit today? : rash  Questionnaire about: Chronic problems general questions HPI Form (Submitted on 7/2/2025)  Chief Complaint: Chronic problems general questions HPI Form    Patient Active Problem List   Diagnosis    BRCA1 positive    History of malignant neoplasm of breast    Seasonal allergic rhinitis    Dermatochalasis of both upper eyelids    Estrogen receptor negative status (ER-)     History reviewed. No pertinent past medical history.  Past Surgical History:   Procedure Laterality Date    BILATERAL OOPHORECTOMY      BIOPSY BREAST  05/02/2013    BUNIONECTOMY      CATARACT EXTRACTION  Bilateral     CATARACT EXTRACTION EXTRACAPSULAR W/ INTRAOCULAR LENS IMPLANTATION      1/21/2021, 1/26/2021    COLONOSCOPY  06/26/2012    COLPOSCOPY  2002    normal results per patient    DUAL ENERGY X-RAY ABSORPT, 1+ SITES  04/23/2012    EYE SURGERY  03/2020    laser    HPV - SCREEN & TYPING  03/01/2009    negative test    INSERTION OF ACCESS PORT  06/13/2013    age 5 or older    LAPAROTOMY EXPLORATORY  05/17/1999    LUMPECTOMY BREAST      LUMPECTOMY BREAST  05/24/2013    MASTECTOMY Bilateral 10/2013    OOPHORECTOMY Left 05/17/1999    PAP SMEAR SCREEN  05/15/2017    TX REVISE BREAST RECONSTRUCTION  04/2014    SALPINGO-OOPHORECTOMY, COMBINED Right 01/02/2015     Current Outpatient Medications   Medication Sig Dispense Refill    cholecalciferol 50 MCG (2000 UT) CAPS Take 2,000 Units by mouth      Ferrous Sulfate 324 (65 Fe) MG TBEC       MULTIPLE VITAMIN PO Take 1 capsule by mouth      Omega-3 Fatty Acids (FISH OIL-OMEGA-3 FATTY ACID) 1000 MG DR capsule Take 1 capsule by mouth      triamcinolone (KENALOG) 0.1 % external cream Apply topically 2 times daily. 80 g 1     No current facility-administered medications for this visit.     Allergies   Allergen Reactions    Amoxicillin Rash    Penicillin G Hives    Sulfa Antibiotics Rash     Social History     Socioeconomic History    Marital status:      Spouse name: Not on file    Number of children: Not on file    Years of education: Not on file    Highest education level: Not on file   Occupational History    Not on file   Tobacco Use    Smoking status: Never     Passive exposure: Never    Smokeless tobacco: Never   Vaping Use    Vaping status: Never Used   Substance and Sexual Activity    Alcohol use: Not on file    Drug use: Not on file    Sexual activity: Not on file   Other Topics Concern    Parent/sibling w/ CABG, MI or angioplasty before 65F 55M? No   Social History Narrative    Not on file     Social Drivers of Health     Financial Resource Strain: Low Risk   (12/23/2024)    Financial Resource Strain     Within the past 12 months, have you or your family members you live with been unable to get utilities (heat, electricity) when it was really needed?: No   Food Insecurity: Low Risk  (12/23/2024)    Food Insecurity     Within the past 12 months, did you worry that your food would run out before you got money to buy more?: No     Within the past 12 months, did the food you bought just not last and you didn t have money to get more?: No   Transportation Needs: Low Risk  (12/23/2024)    Transportation Needs     Within the past 12 months, has lack of transportation kept you from medical appointments, getting your medicines, non-medical meetings or appointments, work, or from getting things that you need?: No   Physical Activity: Unknown (12/23/2024)    Exercise Vital Sign     Days of Exercise per Week: 7 days     Minutes of Exercise per Session: Not on file   Stress: No Stress Concern Present (12/23/2024)    [employer] [place] [employer] - Occupational Stress Questionnaire     Feeling of Stress : Not at all   Social Connections: Unknown (12/23/2024)    Social Connection and Isolation Panel [NHANES]     Frequency of Communication with Friends and Family: Not on file     Frequency of Social Gatherings with Friends and Family: Twice a week     Attends Rastafarian Services: Not on file     Active Member of Clubs or Organizations: Not on file     Attends Club or Organization Meetings: Not on file     Marital Status: Not on file   Interpersonal Safety: Low Risk  (12/23/2024)    Interpersonal Safety     Do you feel physically and emotionally safe where you currently live?: Yes     Within the past 12 months, have you been hit, slapped, kicked or otherwise physically hurt by someone?: No     Within the past 12 months, have you been humiliated or emotionally abused in other ways by your partner or ex-partner?: No   Housing Stability: Low Risk  (12/23/2024)    Housing Stability      Do you have housing? : Yes     Are you worried about losing your housing?: No     Family History   Problem Relation Age of Onset    Coronary Artery Disease Mother          at age: 83 years Cause of death: Pancreatic cancer    Pancreatic Cancer Mother     Myocardial Infarction Mother 78    Impaired Fasting Glucose Mother     Myocardial Infarction Father     Hyperlipidemia Father     Hypertension Father     Diabetes Father     Coronary Artery Disease Father 75        CABG    Skin Cancer Father     Hypertension Brother     Hypertension Brother     Diabetes Brother     Breast Cancer Maternal Grandmother          at age: unknown    Diabetes Type 2  Maternal Grandmother     Prostate Cancer Maternal Grandfather     Cerebrovascular Disease Paternal Grandmother          at age: unknown    Prostate Cancer Paternal Grandfather          at age: unknown    No Known Problems Son         age: 37 years    No Known Problems Son         age: 35 years    No Known Problems Son         age: 31 years    Breast Cancer Maternal Aunt     Breast Cancer Paternal Aunt     Hypertension Brother     Anesthesia Reaction No family hx of      Review of systems is as stated in HPI, and the remainder of system review is otherwise negative.    Objective:     LMP  (LMP Unknown)     General appearance: awake, NAD  HEENT: atraumatic, normocephalic  Lungs: breathing comfortably on room air, no cough  Skin :scattered erythematous papules  Neuro: alert, oriented x3, CNs grossly intact, no focal deficits appreciated  Psych: normal mood/affect/behavior, answering questions appropriately, linear thought process      Video-Visit Details    Type of service:  Video Visit   Start time :5:14PM  End time: 5:24PM  Originating Location (pt. Location): Home  Distant Location (provider location):  On-site  Platform used for Video Visit: Mariama  Signed Electronically by: Leyla Alcaraz MD